# Patient Record
Sex: MALE | Race: WHITE | NOT HISPANIC OR LATINO
[De-identification: names, ages, dates, MRNs, and addresses within clinical notes are randomized per-mention and may not be internally consistent; named-entity substitution may affect disease eponyms.]

---

## 2017-01-31 ENCOUNTER — MEDICATION RENEWAL (OUTPATIENT)
Age: 62
End: 2017-01-31

## 2017-03-24 ENCOUNTER — MEDICATION RENEWAL (OUTPATIENT)
Age: 62
End: 2017-03-24

## 2017-04-25 ENCOUNTER — LABORATORY RESULT (OUTPATIENT)
Age: 62
End: 2017-04-25

## 2017-04-25 ENCOUNTER — APPOINTMENT (OUTPATIENT)
Dept: INTERNAL MEDICINE | Facility: CLINIC | Age: 62
End: 2017-04-25

## 2017-04-25 ENCOUNTER — NON-APPOINTMENT (OUTPATIENT)
Age: 62
End: 2017-04-25

## 2017-04-25 VITALS — HEIGHT: 68.5 IN | WEIGHT: 156 LBS | BODY MASS INDEX: 23.37 KG/M2

## 2017-04-25 VITALS — SYSTOLIC BLOOD PRESSURE: 120 MMHG | DIASTOLIC BLOOD PRESSURE: 70 MMHG

## 2017-04-25 LAB
BILIRUB UR QL STRIP: NORMAL
CLARITY UR: CLEAR
COLLECTION METHOD: NORMAL
GLUCOSE UR-MCNC: NORMAL
HCG UR QL: 0.2 EU/DL
HGB UR QL STRIP.AUTO: NORMAL
KETONES UR-MCNC: NORMAL
LEUKOCYTE ESTERASE UR QL STRIP: NORMAL
NITRITE UR QL STRIP: NORMAL
PH UR STRIP: 6
PROT UR STRIP-MCNC: NORMAL
SP GR UR STRIP: 1.01

## 2017-04-26 LAB
25(OH)D3 SERPL-MCNC: 25.4 NG/ML
ALBUMIN SERPL ELPH-MCNC: 4.5 G/DL
ALP BLD-CCNC: 52 U/L
ALT SERPL-CCNC: 23 U/L
ANION GAP SERPL CALC-SCNC: 17 MMOL/L
APPEARANCE: CLEAR
AST SERPL-CCNC: 26 U/L
BACTERIA: NEGATIVE
BASOPHILS # BLD AUTO: 0.01 K/UL
BASOPHILS NFR BLD AUTO: 0.1 %
BILIRUB SERPL-MCNC: 0.4 MG/DL
BILIRUBIN URINE: NEGATIVE
BLOOD URINE: ABNORMAL
BUN SERPL-MCNC: 14 MG/DL
CALCIUM SERPL-MCNC: 9.7 MG/DL
CHLORIDE SERPL-SCNC: 101 MMOL/L
CHOLEST SERPL-MCNC: 225 MG/DL
CHOLEST/HDLC SERPL: 2.6 RATIO
CO2 SERPL-SCNC: 23 MMOL/L
COLOR: YELLOW
CREAT SERPL-MCNC: 1.13 MG/DL
EOSINOPHIL # BLD AUTO: 0.16 K/UL
EOSINOPHIL NFR BLD AUTO: 2.3 %
FERRITIN SERPL-MCNC: 221.6 NG/ML
GLUCOSE QUALITATIVE U: NORMAL MG/DL
GLUCOSE SERPL-MCNC: 78 MG/DL
HBA1C MFR BLD HPLC: 5.8 %
HCT VFR BLD CALC: 43.8 %
HDLC SERPL-MCNC: 86 MG/DL
HGB BLD-MCNC: 14.4 G/DL
HYALINE CASTS: 0 /LPF
IMM GRANULOCYTES NFR BLD AUTO: 0.1 %
KETONES URINE: NEGATIVE
LDLC SERPL CALC-MCNC: 116 MG/DL
LEUKOCYTE ESTERASE URINE: NEGATIVE
LYMPHOCYTES # BLD AUTO: 1.98 K/UL
LYMPHOCYTES NFR BLD AUTO: 29 %
MAN DIFF?: NORMAL
MCHC RBC-ENTMCNC: 31 PG
MCHC RBC-ENTMCNC: 32.9 GM/DL
MCV RBC AUTO: 94.2 FL
MICROSCOPIC-UA: NORMAL
MONOCYTES # BLD AUTO: 0.4 K/UL
MONOCYTES NFR BLD AUTO: 5.9 %
NEUTROPHILS # BLD AUTO: 4.27 K/UL
NEUTROPHILS NFR BLD AUTO: 62.6 %
NITRITE URINE: NEGATIVE
PH URINE: 6.5
PLATELET # BLD AUTO: 281 K/UL
POTASSIUM SERPL-SCNC: 4.2 MMOL/L
PROT SERPL-MCNC: 7.6 G/DL
PROTEIN URINE: NEGATIVE MG/DL
PSA SERPL-MCNC: 2.18 NG/ML
RBC # BLD: 4.65 M/UL
RBC # FLD: 13.7 %
RED BLOOD CELLS URINE: 0 /HPF
SAVE SPECIMEN: NORMAL
SODIUM SERPL-SCNC: 141 MMOL/L
SPECIFIC GRAVITY URINE: 1.01
SQUAMOUS EPITHELIAL CELLS: 0 /HPF
T3RU NFR SERPL: 0.97 INDEX
T4 SERPL-MCNC: 4.6 UG/DL
TRIGL SERPL-MCNC: 114 MG/DL
TSH SERPL-ACNC: 2.16 UIU/ML
URATE SERPL-MCNC: 4 MG/DL
UROBILINOGEN URINE: NORMAL MG/DL
WBC # FLD AUTO: 6.83 K/UL
WHITE BLOOD CELLS URINE: 0 /HPF

## 2017-04-27 LAB — MERCURY BLD-MCNC: 1.7 UG/L

## 2017-05-01 ENCOUNTER — MEDICATION RENEWAL (OUTPATIENT)
Age: 62
End: 2017-05-01

## 2017-05-22 ENCOUNTER — APPOINTMENT (OUTPATIENT)
Dept: ORTHOPEDIC SURGERY | Facility: CLINIC | Age: 62
End: 2017-05-22

## 2017-06-13 ENCOUNTER — MEDICATION RENEWAL (OUTPATIENT)
Age: 62
End: 2017-06-13

## 2017-07-06 ENCOUNTER — APPOINTMENT (OUTPATIENT)
Dept: INTERNAL MEDICINE | Facility: CLINIC | Age: 62
End: 2017-07-06

## 2017-07-06 VITALS — HEIGHT: 68.5 IN | BODY MASS INDEX: 23.82 KG/M2 | WEIGHT: 159 LBS

## 2017-07-06 VITALS — SYSTOLIC BLOOD PRESSURE: 120 MMHG | DIASTOLIC BLOOD PRESSURE: 70 MMHG

## 2017-07-27 ENCOUNTER — MEDICATION RENEWAL (OUTPATIENT)
Age: 62
End: 2017-07-27

## 2017-10-10 ENCOUNTER — MEDICATION RENEWAL (OUTPATIENT)
Age: 62
End: 2017-10-10

## 2017-11-27 ENCOUNTER — MEDICATION RENEWAL (OUTPATIENT)
Age: 62
End: 2017-11-27

## 2018-02-06 ENCOUNTER — MEDICATION RENEWAL (OUTPATIENT)
Age: 63
End: 2018-02-06

## 2018-03-19 ENCOUNTER — MEDICATION RENEWAL (OUTPATIENT)
Age: 63
End: 2018-03-19

## 2018-04-25 ENCOUNTER — MEDICATION RENEWAL (OUTPATIENT)
Age: 63
End: 2018-04-25

## 2018-06-05 ENCOUNTER — MEDICATION RENEWAL (OUTPATIENT)
Age: 63
End: 2018-06-05

## 2018-07-16 ENCOUNTER — APPOINTMENT (OUTPATIENT)
Dept: INTERNAL MEDICINE | Facility: CLINIC | Age: 63
End: 2018-07-16
Payer: COMMERCIAL

## 2018-08-02 ENCOUNTER — LABORATORY RESULT (OUTPATIENT)
Age: 63
End: 2018-08-02

## 2018-08-02 ENCOUNTER — NON-APPOINTMENT (OUTPATIENT)
Age: 63
End: 2018-08-02

## 2018-08-02 ENCOUNTER — APPOINTMENT (OUTPATIENT)
Dept: INTERNAL MEDICINE | Facility: CLINIC | Age: 63
End: 2018-08-02
Payer: COMMERCIAL

## 2018-08-02 VITALS — SYSTOLIC BLOOD PRESSURE: 120 MMHG | DIASTOLIC BLOOD PRESSURE: 70 MMHG

## 2018-08-02 VITALS — WEIGHT: 157 LBS | BODY MASS INDEX: 23.52 KG/M2 | HEIGHT: 68.5 IN

## 2018-08-02 LAB
BILIRUB UR QL STRIP: NORMAL
CLARITY UR: CLEAR
COLLECTION METHOD: NORMAL
DATE COLLECTED: NORMAL
GLUCOSE UR-MCNC: NORMAL
HCG UR QL: 0.2 EU/DL
HEMOCCULT SP1 STL QL: NEGATIVE
HGB UR QL STRIP.AUTO: NORMAL
KETONES UR-MCNC: NORMAL
LEUKOCYTE ESTERASE UR QL STRIP: NORMAL
NITRITE UR QL STRIP: NORMAL
PH UR STRIP: 6.5
PROT UR STRIP-MCNC: NORMAL
QUALITY CONTROL: YES
SP GR UR STRIP: 1.02

## 2018-08-02 PROCEDURE — 36415 COLL VENOUS BLD VENIPUNCTURE: CPT

## 2018-08-02 PROCEDURE — 81003 URINALYSIS AUTO W/O SCOPE: CPT | Mod: QW

## 2018-08-02 PROCEDURE — 93000 ELECTROCARDIOGRAM COMPLETE: CPT

## 2018-08-02 PROCEDURE — 99396 PREV VISIT EST AGE 40-64: CPT | Mod: 25

## 2018-08-02 RX ORDER — DOXYCYCLINE HYCLATE 100 MG/1
100 CAPSULE ORAL
Qty: 14 | Refills: 1 | Status: DISCONTINUED | COMMUNITY
Start: 2017-07-06 | End: 2018-08-02

## 2018-08-02 RX ORDER — HYDROCORTISONE 25 MG/G
2.5 CREAM TOPICAL 3 TIMES DAILY
Qty: 1 | Refills: 2 | Status: DISCONTINUED | COMMUNITY
Start: 2017-07-06 | End: 2018-08-02

## 2018-08-02 RX ORDER — CLOTRIMAZOLE AND BETAMETHASONE DIPROPIONATE 10; .5 MG/G; MG/G
1-0.05 CREAM TOPICAL 3 TIMES DAILY
Qty: 1 | Refills: 2 | Status: DISCONTINUED | COMMUNITY
Start: 2017-04-25 | End: 2018-08-02

## 2018-08-02 RX ORDER — METHYLPREDNISOLONE 4 MG/1
4 TABLET ORAL
Qty: 1 | Refills: 1 | Status: DISCONTINUED | COMMUNITY
Start: 2017-07-06 | End: 2018-08-02

## 2018-08-02 NOTE — HEALTH RISK ASSESSMENT
[] : No [No falls in past year] : Patient reported no falls in the past year [0] : 2) Feeling down, depressed, or hopeless: Not at all (0) [QAK1Vfdcs] : 0 [Fully functional (bathing, dressing, toileting, transferring, walking, feeding)] : Fully functional (bathing, dressing, toileting, transferring, walking, feeding) [Fully functional (using the telephone, shopping, preparing meals, housekeeping, doing laundry, using] : Fully functional and needs no help or supervision to perform IADLs (using the telephone, shopping, preparing meals, housekeeping, doing laundry, using transportation, managing medications and managing finances)

## 2018-08-02 NOTE — PHYSICAL EXAM

## 2018-08-02 NOTE — ASSESSMENT
[FreeTextEntry1] : The patient's vital signs were stable. His physical examination is normal except for alopecia totalis and some moles. I recommended the patient see his neurologist. I also recommended that he proceed with a colonoscopy and also see a dermatologist

## 2018-08-02 NOTE — HISTORY OF PRESENT ILLNESS
[de-identified] : This is a 63-year-old gentleman with a history of alopecia totalis who is here today for his annual examination. 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 He has not followed up with his neurologist. In addition his last colonoscopy was 7 years ago

## 2018-08-03 LAB
25(OH)D3 SERPL-MCNC: 39.4 NG/ML
ALBUMIN SERPL ELPH-MCNC: 4.6 G/DL
ALP BLD-CCNC: 51 U/L
ALT SERPL-CCNC: 39 U/L
ANION GAP SERPL CALC-SCNC: 14 MMOL/L
APPEARANCE: CLEAR
AST SERPL-CCNC: 36 U/L
BACTERIA: NEGATIVE
BASOPHILS # BLD AUTO: 0.01 K/UL
BASOPHILS NFR BLD AUTO: 0.2 %
BILIRUB SERPL-MCNC: 0.5 MG/DL
BILIRUBIN URINE: NEGATIVE
BLOOD URINE: NEGATIVE
BUN SERPL-MCNC: 17 MG/DL
CALCIUM SERPL-MCNC: 9.4 MG/DL
CHLORIDE SERPL-SCNC: 101 MMOL/L
CHOLEST SERPL-MCNC: 214 MG/DL
CHOLEST/HDLC SERPL: 2.6 RATIO
CO2 SERPL-SCNC: 26 MMOL/L
COLOR: YELLOW
CREAT SERPL-MCNC: 1.09 MG/DL
EOSINOPHIL # BLD AUTO: 0.11 K/UL
EOSINOPHIL NFR BLD AUTO: 1.8 %
FERRITIN SERPL-MCNC: 215 NG/ML
GLUCOSE QUALITATIVE U: NEGATIVE MG/DL
GLUCOSE SERPL-MCNC: 82 MG/DL
HBA1C MFR BLD HPLC: 5.6 %
HCT VFR BLD CALC: 44.5 %
HDLC SERPL-MCNC: 82 MG/DL
HGB BLD-MCNC: 14.6 G/DL
HYALINE CASTS: 0 /LPF
IMM GRANULOCYTES NFR BLD AUTO: 0.2 %
KETONES URINE: ABNORMAL
LDLC SERPL CALC-MCNC: 112 MG/DL
LEUKOCYTE ESTERASE URINE: NEGATIVE
LYMPHOCYTES # BLD AUTO: 2.56 K/UL
LYMPHOCYTES NFR BLD AUTO: 42.3 %
MAN DIFF?: NORMAL
MCHC RBC-ENTMCNC: 31.6 PG
MCHC RBC-ENTMCNC: 32.8 GM/DL
MCV RBC AUTO: 96.3 FL
MICROSCOPIC-UA: NORMAL
MONOCYTES # BLD AUTO: 0.4 K/UL
MONOCYTES NFR BLD AUTO: 6.6 %
NEUTROPHILS # BLD AUTO: 2.96 K/UL
NEUTROPHILS NFR BLD AUTO: 48.9 %
NITRITE URINE: NEGATIVE
PH URINE: 6.5
PLATELET # BLD AUTO: 277 K/UL
POTASSIUM SERPL-SCNC: 4.5 MMOL/L
PROT SERPL-MCNC: 7.6 G/DL
PROTEIN URINE: NEGATIVE MG/DL
PSA SERPL-MCNC: 2.15 NG/ML
RBC # BLD: 4.62 M/UL
RBC # FLD: 13.8 %
RED BLOOD CELLS URINE: 2 /HPF
SAVE SPECIMEN: NORMAL
SODIUM SERPL-SCNC: 141 MMOL/L
SPECIFIC GRAVITY URINE: 1.02
SQUAMOUS EPITHELIAL CELLS: 0 /HPF
T3RU NFR SERPL: 1.03 INDEX
T4 SERPL-MCNC: 4.9 UG/DL
TRIGL SERPL-MCNC: 102 MG/DL
TSH SERPL-ACNC: 2.67 UIU/ML
URATE SERPL-MCNC: 4.1 MG/DL
UROBILINOGEN URINE: NEGATIVE MG/DL
VIT B12 SERPL-MCNC: 469 PG/ML
WBC # FLD AUTO: 6.05 K/UL
WHITE BLOOD CELLS URINE: 0 /HPF

## 2018-08-14 ENCOUNTER — MEDICATION RENEWAL (OUTPATIENT)
Age: 63
End: 2018-08-14

## 2018-09-25 ENCOUNTER — MEDICATION RENEWAL (OUTPATIENT)
Age: 63
End: 2018-09-25

## 2018-11-05 ENCOUNTER — MEDICATION RENEWAL (OUTPATIENT)
Age: 63
End: 2018-11-05

## 2018-12-10 ENCOUNTER — MEDICATION RENEWAL (OUTPATIENT)
Age: 63
End: 2018-12-10

## 2019-02-22 ENCOUNTER — MEDICATION RENEWAL (OUTPATIENT)
Age: 64
End: 2019-02-22

## 2019-04-04 ENCOUNTER — MEDICATION RENEWAL (OUTPATIENT)
Age: 64
End: 2019-04-04

## 2019-05-14 ENCOUNTER — MEDICATION RENEWAL (OUTPATIENT)
Age: 64
End: 2019-05-14

## 2019-06-05 ENCOUNTER — APPOINTMENT (OUTPATIENT)
Dept: MRI IMAGING | Facility: IMAGING CENTER | Age: 64
End: 2019-06-05
Payer: COMMERCIAL

## 2019-06-05 ENCOUNTER — OUTPATIENT (OUTPATIENT)
Dept: OUTPATIENT SERVICES | Facility: HOSPITAL | Age: 64
LOS: 1 days | End: 2019-06-05
Payer: COMMERCIAL

## 2019-06-05 DIAGNOSIS — R51 HEADACHE: ICD-10-CM

## 2019-06-05 PROCEDURE — 70551 MRI BRAIN STEM W/O DYE: CPT | Mod: 26

## 2019-06-05 PROCEDURE — 70551 MRI BRAIN STEM W/O DYE: CPT

## 2019-06-12 ENCOUNTER — MEDICATION RENEWAL (OUTPATIENT)
Age: 64
End: 2019-06-12

## 2019-06-13 ENCOUNTER — MEDICATION RENEWAL (OUTPATIENT)
Age: 64
End: 2019-06-13

## 2019-07-16 ENCOUNTER — MEDICATION RENEWAL (OUTPATIENT)
Age: 64
End: 2019-07-16

## 2019-08-18 ENCOUNTER — OUTPATIENT (OUTPATIENT)
Dept: OUTPATIENT SERVICES | Facility: HOSPITAL | Age: 64
LOS: 1 days | End: 2019-08-18
Payer: COMMERCIAL

## 2019-08-18 ENCOUNTER — APPOINTMENT (OUTPATIENT)
Dept: MRI IMAGING | Facility: IMAGING CENTER | Age: 64
End: 2019-08-18
Payer: COMMERCIAL

## 2019-08-18 DIAGNOSIS — Z00.8 ENCOUNTER FOR OTHER GENERAL EXAMINATION: ICD-10-CM

## 2019-08-18 PROCEDURE — 72141 MRI NECK SPINE W/O DYE: CPT

## 2019-08-18 PROCEDURE — 72141 MRI NECK SPINE W/O DYE: CPT | Mod: 26

## 2019-08-20 ENCOUNTER — MEDICATION RENEWAL (OUTPATIENT)
Age: 64
End: 2019-08-20

## 2019-09-24 ENCOUNTER — OUTPATIENT (OUTPATIENT)
Dept: OUTPATIENT SERVICES | Facility: HOSPITAL | Age: 64
LOS: 1 days | End: 2019-09-24
Payer: COMMERCIAL

## 2019-09-24 ENCOUNTER — APPOINTMENT (OUTPATIENT)
Dept: MRI IMAGING | Facility: IMAGING CENTER | Age: 64
End: 2019-09-24

## 2019-09-24 DIAGNOSIS — R51 HEADACHE: ICD-10-CM

## 2019-09-24 PROCEDURE — 70544 MR ANGIOGRAPHY HEAD W/O DYE: CPT

## 2019-09-24 PROCEDURE — 70544 MR ANGIOGRAPHY HEAD W/O DYE: CPT | Mod: 26

## 2019-09-25 ENCOUNTER — MEDICATION RENEWAL (OUTPATIENT)
Age: 64
End: 2019-09-25

## 2019-09-26 ENCOUNTER — MEDICATION RENEWAL (OUTPATIENT)
Age: 64
End: 2019-09-26

## 2019-10-28 ENCOUNTER — LABORATORY RESULT (OUTPATIENT)
Age: 64
End: 2019-10-28

## 2019-10-28 ENCOUNTER — MEDICATION RENEWAL (OUTPATIENT)
Age: 64
End: 2019-10-28

## 2019-10-28 ENCOUNTER — APPOINTMENT (OUTPATIENT)
Dept: INTERNAL MEDICINE | Facility: CLINIC | Age: 64
End: 2019-10-28
Payer: COMMERCIAL

## 2019-10-28 ENCOUNTER — NON-APPOINTMENT (OUTPATIENT)
Age: 64
End: 2019-10-28

## 2019-10-28 VITALS — HEIGHT: 68.5 IN | BODY MASS INDEX: 23.22 KG/M2 | WEIGHT: 155 LBS

## 2019-10-28 VITALS — SYSTOLIC BLOOD PRESSURE: 120 MMHG | DIASTOLIC BLOOD PRESSURE: 70 MMHG

## 2019-10-28 PROCEDURE — 93000 ELECTROCARDIOGRAM COMPLETE: CPT

## 2019-10-28 PROCEDURE — 90686 IIV4 VACC NO PRSV 0.5 ML IM: CPT

## 2019-10-28 PROCEDURE — 99396 PREV VISIT EST AGE 40-64: CPT | Mod: 25

## 2019-10-28 PROCEDURE — G0008: CPT

## 2019-10-28 PROCEDURE — 36415 COLL VENOUS BLD VENIPUNCTURE: CPT

## 2019-10-28 NOTE — HEALTH RISK ASSESSMENT
[] : No [No] : In the past 12 months have you used drugs other than those required for medical reasons? No [No falls in past year] : Patient reported no falls in the past year [0] : 2) Feeling down, depressed, or hopeless: Not at all (0) [Audit-CScore] : 0 [[id number]] : 0 [Change in mental status noted] : No change in mental status noted [Fully functional (bathing, dressing, toileting, transferring, walking, feeding)] : Fully functional (bathing, dressing, toileting, transferring, walking, feeding) [Fully functional (using the telephone, shopping, preparing meals, housekeeping, doing laundry, using] : Fully functional and needs no help or supervision to perform IADLs (using the telephone, shopping, preparing meals, housekeeping, doing laundry, using transportation, managing medications and managing finances)

## 2019-10-28 NOTE — ASSESSMENT
[FreeTextEntry1] : The patient's vital signs were stable. His complete physical examination was normal except for alopecia. His EKG did not reveal any acute changes. I advised the patient to proceed with colonoscopy, pneumonia vaccines, influenza vaccine

## 2019-10-29 LAB
25(OH)D3 SERPL-MCNC: 32.4 NG/ML
ALBUMIN SERPL ELPH-MCNC: 4.6 G/DL
ALP BLD-CCNC: 43 U/L
ALT SERPL-CCNC: 17 U/L
ANION GAP SERPL CALC-SCNC: 10 MMOL/L
AST SERPL-CCNC: 21 U/L
BASOPHILS # BLD AUTO: 0.02 K/UL
BASOPHILS NFR BLD AUTO: 0.4 %
BILIRUB SERPL-MCNC: 0.5 MG/DL
BUN SERPL-MCNC: 16 MG/DL
CALCIUM SERPL-MCNC: 9.5 MG/DL
CHLORIDE SERPL-SCNC: 103 MMOL/L
CHOLEST SERPL-MCNC: 231 MG/DL
CHOLEST/HDLC SERPL: 2.6 RATIO
CO2 SERPL-SCNC: 27 MMOL/L
CREAT SERPL-MCNC: 1.06 MG/DL
EOSINOPHIL # BLD AUTO: 0.14 K/UL
EOSINOPHIL NFR BLD AUTO: 2.5 %
ESTIMATED AVERAGE GLUCOSE: 105 MG/DL
FERRITIN SERPL-MCNC: 181 NG/ML
GLUCOSE SERPL-MCNC: 83 MG/DL
HBA1C MFR BLD HPLC: 5.3 %
HCT VFR BLD CALC: 44.2 %
HDLC SERPL-MCNC: 88 MG/DL
HGB BLD-MCNC: 14.2 G/DL
IMM GRANULOCYTES NFR BLD AUTO: 0.2 %
LDLC SERPL CALC-MCNC: 120 MG/DL
LYMPHOCYTES # BLD AUTO: 2.15 K/UL
LYMPHOCYTES NFR BLD AUTO: 38.5 %
MAN DIFF?: NORMAL
MCHC RBC-ENTMCNC: 31.1 PG
MCHC RBC-ENTMCNC: 32.1 GM/DL
MCV RBC AUTO: 96.7 FL
MONOCYTES # BLD AUTO: 0.37 K/UL
MONOCYTES NFR BLD AUTO: 6.6 %
NEUTROPHILS # BLD AUTO: 2.9 K/UL
NEUTROPHILS NFR BLD AUTO: 51.8 %
PLATELET # BLD AUTO: 277 K/UL
POTASSIUM SERPL-SCNC: 4.5 MMOL/L
PROT SERPL-MCNC: 7.1 G/DL
PSA SERPL-MCNC: 2.5 NG/ML
RBC # BLD: 4.57 M/UL
RBC # FLD: 13.4 %
SAVE SPECIMEN: NORMAL
SODIUM SERPL-SCNC: 140 MMOL/L
T3RU NFR SERPL: 1 TBI
T4 SERPL-MCNC: 3.8 UG/DL
TRIGL SERPL-MCNC: 115 MG/DL
TSH SERPL-ACNC: 2.19 UIU/ML
URATE SERPL-MCNC: 4.3 MG/DL
VIT B12 SERPL-MCNC: 419 PG/ML
WBC # FLD AUTO: 5.59 K/UL

## 2019-11-27 ENCOUNTER — OTHER (OUTPATIENT)
Age: 64
End: 2019-11-27

## 2019-12-05 ENCOUNTER — MEDICATION RENEWAL (OUTPATIENT)
Age: 64
End: 2019-12-05

## 2020-06-13 ENCOUNTER — TRANSCRIPTION ENCOUNTER (OUTPATIENT)
Age: 65
End: 2020-06-13

## 2020-11-03 ENCOUNTER — LABORATORY RESULT (OUTPATIENT)
Age: 65
End: 2020-11-03

## 2020-11-03 ENCOUNTER — NON-APPOINTMENT (OUTPATIENT)
Age: 65
End: 2020-11-03

## 2020-11-03 ENCOUNTER — APPOINTMENT (OUTPATIENT)
Dept: INTERNAL MEDICINE | Facility: CLINIC | Age: 65
End: 2020-11-03
Payer: COMMERCIAL

## 2020-11-03 VITALS — DIASTOLIC BLOOD PRESSURE: 70 MMHG | SYSTOLIC BLOOD PRESSURE: 120 MMHG

## 2020-11-03 VITALS — HEIGHT: 68.5 IN | BODY MASS INDEX: 22.47 KG/M2 | TEMPERATURE: 97.2 F | WEIGHT: 150 LBS

## 2020-11-03 DIAGNOSIS — L85.3 XEROSIS CUTIS: ICD-10-CM

## 2020-11-03 DIAGNOSIS — Z23 ENCOUNTER FOR IMMUNIZATION: ICD-10-CM

## 2020-11-03 DIAGNOSIS — Z87.09 PERSONAL HISTORY OF OTHER DISEASES OF THE RESPIRATORY SYSTEM: ICD-10-CM

## 2020-11-03 PROCEDURE — 90662 IIV NO PRSV INCREASED AG IM: CPT

## 2020-11-03 PROCEDURE — 36415 COLL VENOUS BLD VENIPUNCTURE: CPT

## 2020-11-03 PROCEDURE — 99397 PER PM REEVAL EST PAT 65+ YR: CPT | Mod: 25

## 2020-11-03 PROCEDURE — 93000 ELECTROCARDIOGRAM COMPLETE: CPT

## 2020-11-03 PROCEDURE — 99072 ADDL SUPL MATRL&STAF TM PHE: CPT

## 2020-11-03 PROCEDURE — G0008: CPT

## 2020-11-03 NOTE — ASSESSMENT
[FreeTextEntry1] : The patient's blood pressure was 130/80. His physical examination was significant for alopecia. The patient was advised that he is due for his colonoscopy his influenza vaccine and his pneumococcal vaccine.

## 2020-11-03 NOTE — PHYSICAL EXAM
[Normal Sphincter Tone] : normal sphincter tone [No Mass] : no mass [Stool Occult Blood] : stool negative for occult blood [Urethral Meatus] : meatus normal [Urinary Bladder Findings] : the bladder was normal on palpation [Scrotum] : the scrotum was normal [Testes Mass (___cm)] : there were no testicular masses [No Prostate Nodules] : no prostate nodules [Normal] : affect was normal and insight and judgment were intact [de-identified] : alopecia to

## 2020-11-03 NOTE — HEALTH RISK ASSESSMENT
[] : No [No] : No [No falls in past year] : Patient reported no falls in the past year [0] : 2) Feeling down, depressed, or hopeless: Not at all (0) [[id number]] : 0 [Fully functional (bathing, dressing, toileting, transferring, walking, feeding)] : Fully functional (bathing, dressing, toileting, transferring, walking, feeding) [Fully functional (using the telephone, shopping, preparing meals, housekeeping, doing laundry, using] : Fully functional and needs no help or supervision to perform IADLs (using the telephone, shopping, preparing meals, housekeeping, doing laundry, using transportation, managing medications and managing finances) [Reviewed no changes] : Reviewed no changes [I will adhere to the patient's wishes as expressed in the advance directive except as noted below.] : I will adhere to the patient's wishes as expressed in the advance directive except as noted below [AdvancecareDate] : 11/3/2020

## 2020-11-04 LAB
25(OH)D3 SERPL-MCNC: 42.3 NG/ML
ALBUMIN SERPL ELPH-MCNC: 4.8 G/DL
ALP BLD-CCNC: 50 U/L
ALT SERPL-CCNC: 23 U/L
ANION GAP SERPL CALC-SCNC: 12 MMOL/L
APPEARANCE: CLEAR
AST SERPL-CCNC: 21 U/L
BACTERIA: NEGATIVE
BASOPHILS # BLD AUTO: 0.02 K/UL
BASOPHILS NFR BLD AUTO: 0.3 %
BILIRUB SERPL-MCNC: 0.8 MG/DL
BILIRUBIN URINE: NEGATIVE
BLOOD URINE: NEGATIVE
BUN SERPL-MCNC: 16 MG/DL
CALCIUM SERPL-MCNC: 9.6 MG/DL
CHLORIDE SERPL-SCNC: 103 MMOL/L
CHOLEST SERPL-MCNC: 239 MG/DL
CO2 SERPL-SCNC: 27 MMOL/L
COLOR: NORMAL
CREAT SERPL-MCNC: 1.08 MG/DL
EOSINOPHIL # BLD AUTO: 0.09 K/UL
EOSINOPHIL NFR BLD AUTO: 1.3 %
ESTIMATED AVERAGE GLUCOSE: 108 MG/DL
FERRITIN SERPL-MCNC: 177 NG/ML
FOLATE SERPL-MCNC: 10.9 NG/ML
GLUCOSE QUALITATIVE U: NEGATIVE
GLUCOSE SERPL-MCNC: 104 MG/DL
HBA1C MFR BLD HPLC: 5.4 %
HCT VFR BLD CALC: 46.1 %
HDLC SERPL-MCNC: 86 MG/DL
HGB BLD-MCNC: 15 G/DL
HYALINE CASTS: 0 /LPF
IMM GRANULOCYTES NFR BLD AUTO: 0.1 %
KETONES URINE: NEGATIVE
LDLC SERPL CALC-MCNC: 138 MG/DL
LEUKOCYTE ESTERASE URINE: NEGATIVE
LYMPHOCYTES # BLD AUTO: 1.98 K/UL
LYMPHOCYTES NFR BLD AUTO: 29.1 %
MAN DIFF?: NORMAL
MCHC RBC-ENTMCNC: 31.1 PG
MCHC RBC-ENTMCNC: 32.5 GM/DL
MCV RBC AUTO: 95.6 FL
MICROSCOPIC-UA: NORMAL
MONOCYTES # BLD AUTO: 0.32 K/UL
MONOCYTES NFR BLD AUTO: 4.7 %
NEUTROPHILS # BLD AUTO: 4.38 K/UL
NEUTROPHILS NFR BLD AUTO: 64.5 %
NITRITE URINE: NEGATIVE
NONHDLC SERPL-MCNC: 153 MG/DL
PH URINE: 6.5
PLATELET # BLD AUTO: 290 K/UL
POTASSIUM SERPL-SCNC: 4.4 MMOL/L
PROT SERPL-MCNC: 7.1 G/DL
PROTEIN URINE: NEGATIVE
PSA SERPL-MCNC: 2.53 NG/ML
RBC # BLD: 4.82 M/UL
RBC # FLD: 13 %
RED BLOOD CELLS URINE: 1 /HPF
SAVE SPECIMEN: NORMAL
SODIUM SERPL-SCNC: 142 MMOL/L
SPECIFIC GRAVITY URINE: 1.02
SQUAMOUS EPITHELIAL CELLS: 0 /HPF
T3RU NFR SERPL: 1 TBI
T4 SERPL-MCNC: 4.4 UG/DL
TRIGL SERPL-MCNC: 75 MG/DL
TSH SERPL-ACNC: 1.95 UIU/ML
URATE SERPL-MCNC: 3.5 MG/DL
UROBILINOGEN URINE: NORMAL
VIT B12 SERPL-MCNC: 454 PG/ML
WBC # FLD AUTO: 6.8 K/UL
WHITE BLOOD CELLS URINE: 0 /HPF

## 2021-11-11 ENCOUNTER — LABORATORY RESULT (OUTPATIENT)
Age: 66
End: 2021-11-11

## 2021-11-11 ENCOUNTER — APPOINTMENT (OUTPATIENT)
Dept: INTERNAL MEDICINE | Facility: CLINIC | Age: 66
End: 2021-11-11
Payer: COMMERCIAL

## 2021-11-11 ENCOUNTER — NON-APPOINTMENT (OUTPATIENT)
Age: 66
End: 2021-11-11

## 2021-11-11 VITALS — WEIGHT: 146.38 LBS | BODY MASS INDEX: 21.68 KG/M2 | HEIGHT: 69 IN

## 2021-11-11 VITALS — SYSTOLIC BLOOD PRESSURE: 120 MMHG | DIASTOLIC BLOOD PRESSURE: 70 MMHG

## 2021-11-11 PROCEDURE — 93000 ELECTROCARDIOGRAM COMPLETE: CPT

## 2021-11-11 PROCEDURE — 99397 PER PM REEVAL EST PAT 65+ YR: CPT | Mod: 25

## 2021-11-11 PROCEDURE — 36415 COLL VENOUS BLD VENIPUNCTURE: CPT

## 2021-11-11 PROCEDURE — G0008: CPT

## 2021-11-11 PROCEDURE — 90662 IIV NO PRSV INCREASED AG IM: CPT

## 2021-11-11 NOTE — HISTORY OF PRESENT ILLNESS
[de-identified] : This is a 66 -year-old gentleman with a history of alopecia, anxiety, migraine headaches who is here today for his annual wall examination.

## 2021-11-11 NOTE — HEALTH RISK ASSESSMENT
[No] : No [No falls in past year] : Patient reported no falls in the past year [0] : 2) Feeling down, depressed, or hopeless: Not at all (0) [Fully functional (bathing, dressing, toileting, transferring, walking, feeding)] : Fully functional (bathing, dressing, toileting, transferring, walking, feeding) [Fully functional (using the telephone, shopping, preparing meals, housekeeping, doing laundry, using] : Fully functional and needs no help or supervision to perform IADLs (using the telephone, shopping, preparing meals, housekeeping, doing laundry, using transportation, managing medications and managing finances) [] : No [[id number]] : 0

## 2021-11-11 NOTE — PHYSICAL EXAM
[Normal Sphincter Tone] : normal sphincter tone [No Mass] : no mass [Urethral Meatus] : meatus normal [Urinary Bladder Findings] : the bladder was normal on palpation [Scrotum] : the scrotum was normal [Testes Mass (___cm)] : there were no testicular masses [No Prostate Nodules] : no prostate nodules [Normal] : affect was normal and insight and judgment were intact [Stool Occult Blood] : stool negative for occult blood [de-identified] : alopecia to

## 2021-11-14 LAB
25(OH)D3 SERPL-MCNC: 34.7 NG/ML
ALBUMIN SERPL ELPH-MCNC: 4.3 G/DL
ALP BLD-CCNC: 45 U/L
ALT SERPL-CCNC: 22 U/L
ANION GAP SERPL CALC-SCNC: 11 MMOL/L
APPEARANCE: CLEAR
AST SERPL-CCNC: 20 U/L
BACTERIA: NEGATIVE
BASOPHILS # BLD AUTO: 0.04 K/UL
BASOPHILS NFR BLD AUTO: 0.6 %
BILIRUB SERPL-MCNC: 0.5 MG/DL
BILIRUBIN URINE: NEGATIVE
BLOOD URINE: ABNORMAL
BUN SERPL-MCNC: 15 MG/DL
CALCIUM SERPL-MCNC: 9.1 MG/DL
CHLORIDE SERPL-SCNC: 105 MMOL/L
CHOLEST SERPL-MCNC: 225 MG/DL
CO2 SERPL-SCNC: 25 MMOL/L
COLOR: YELLOW
CREAT SERPL-MCNC: 1.08 MG/DL
EOSINOPHIL # BLD AUTO: 0.19 K/UL
EOSINOPHIL NFR BLD AUTO: 3.1 %
ESTIMATED AVERAGE GLUCOSE: 111 MG/DL
FOLATE SERPL-MCNC: 12 NG/ML
GLUCOSE QUALITATIVE U: ABNORMAL
GLUCOSE SERPL-MCNC: 85 MG/DL
HBA1C MFR BLD HPLC: 5.5 %
HCT VFR BLD CALC: 45.2 %
HDLC SERPL-MCNC: 92 MG/DL
HGB BLD-MCNC: 14.8 G/DL
HYALINE CASTS: 2 /LPF
IMM GRANULOCYTES NFR BLD AUTO: 0.2 %
KETONES URINE: NORMAL
LDLC SERPL CALC-MCNC: 103 MG/DL
LEUKOCYTE ESTERASE URINE: NEGATIVE
LYMPHOCYTES # BLD AUTO: 1.76 K/UL
LYMPHOCYTES NFR BLD AUTO: 28.4 %
MAN DIFF?: NORMAL
MCHC RBC-ENTMCNC: 32 PG
MCHC RBC-ENTMCNC: 32.7 GM/DL
MCV RBC AUTO: 97.8 FL
MICROSCOPIC-UA: NORMAL
MONOCYTES # BLD AUTO: 0.39 K/UL
MONOCYTES NFR BLD AUTO: 6.3 %
NEUTROPHILS # BLD AUTO: 3.81 K/UL
NEUTROPHILS NFR BLD AUTO: 61.4 %
NITRITE URINE: NEGATIVE
NONHDLC SERPL-MCNC: 133 MG/DL
PH URINE: 5.5
PLATELET # BLD AUTO: 261 K/UL
POTASSIUM SERPL-SCNC: 4.5 MMOL/L
PROT SERPL-MCNC: 6.8 G/DL
PROTEIN URINE: NORMAL
PSA SERPL-MCNC: 2.77 NG/ML
RBC # BLD: 4.62 M/UL
RBC # FLD: 13.2 %
RED BLOOD CELLS URINE: 6 /HPF
SODIUM SERPL-SCNC: 141 MMOL/L
SPECIFIC GRAVITY URINE: 1.03
SQUAMOUS EPITHELIAL CELLS: 3 /HPF
T3RU NFR SERPL: 1.1 TBI
T4 SERPL-MCNC: 4.2 UG/DL
TRIGL SERPL-MCNC: 150 MG/DL
TSH SERPL-ACNC: 1.36 UIU/ML
URATE SERPL-MCNC: 4.1 MG/DL
UROBILINOGEN URINE: NORMAL
VIT B12 SERPL-MCNC: 438 PG/ML
WBC # FLD AUTO: 6.2 K/UL
WHITE BLOOD CELLS URINE: 8 /HPF

## 2022-06-03 ENCOUNTER — LABORATORY RESULT (OUTPATIENT)
Age: 67
End: 2022-06-03

## 2022-06-03 ENCOUNTER — APPOINTMENT (OUTPATIENT)
Dept: INTERNAL MEDICINE | Facility: CLINIC | Age: 67
End: 2022-06-03
Payer: COMMERCIAL

## 2022-06-03 VITALS — WEIGHT: 149.8 LBS | HEIGHT: 69 IN | BODY MASS INDEX: 22.19 KG/M2

## 2022-06-03 VITALS — DIASTOLIC BLOOD PRESSURE: 70 MMHG | SYSTOLIC BLOOD PRESSURE: 120 MMHG

## 2022-06-03 DIAGNOSIS — L65.9 NONSCARRING HAIR LOSS, UNSPECIFIED: ICD-10-CM

## 2022-06-03 LAB
BILIRUB UR QL STRIP: NEGATIVE
CLARITY UR: CLEAR
COLLECTION METHOD: NORMAL
GLUCOSE UR-MCNC: NEGATIVE
HCG UR QL: 0.2 EU/DL
HGB UR QL STRIP.AUTO: NORMAL
KETONES UR-MCNC: NEGATIVE
LEUKOCYTE ESTERASE UR QL STRIP: NEGATIVE
NITRITE UR QL STRIP: NEGATIVE
PH UR STRIP: 6.5
PROT UR STRIP-MCNC: NORMAL
SP GR UR STRIP: >=1.03

## 2022-06-03 PROCEDURE — 81003 URINALYSIS AUTO W/O SCOPE: CPT | Mod: QW

## 2022-06-03 PROCEDURE — 36415 COLL VENOUS BLD VENIPUNCTURE: CPT

## 2022-06-03 PROCEDURE — 99214 OFFICE O/P EST MOD 30 MIN: CPT | Mod: 25

## 2022-06-03 NOTE — PHYSICAL EXAM
[Normal] : no carotid or abdominal bruits heard, no varicosities, pedal pulses are present, no peripheral edema, no extremity clubbing or cyanosis and no palpable aorta [Normal Sphincter Tone] : normal sphincter tone [No Mass] : no mass [Testes Tenderness] : no tenderness of the testes [Testes Mass (___cm)] : there were no testicular masses [Prostate Enlargement] : the prostate was not enlarged [Prostate Tenderness] : the prostate was not tender [No Prostate Nodules] : no prostate nodules

## 2022-06-03 NOTE — HISTORY OF PRESENT ILLNESS
[de-identified] : This is a 66-year-old gentleman with a history of anxiety attacks and alopecia who today noticed some blood in the urine associated with some suprapubic discomfort.  Also he noticed an undue amount of urinary frequency.  The patient is complaining of significant suprapubic pain and occasional flank pain

## 2022-06-03 NOTE — ASSESSMENT
[FreeTextEntry1] : This is a 66-year-old gentleman complaining of an episode of urinary frequency and noticing a small amount of blood in urine this afternoon.  He also noticed some vague suprapubic discomfort and flank pain or abdominal His blood pressure was normal and his physical examination was negative.  Of note is that his urinalysis reveals a large amount of blood in the urine without any evidence of pyuria or bacteriuria.  His urine was sent for culture and in addition his sugar was 120.  I referred the patient for stat contrast CT of the abdomen and pelvis to rule out a kidney stone, renal carcinoma, possible bladder carcinoma.  In addition I referred him to urology for further evaluation

## 2022-06-04 LAB
25(OH)D3 SERPL-MCNC: 44.1 NG/ML
ALBUMIN SERPL ELPH-MCNC: 4.4 G/DL
ALP BLD-CCNC: 41 U/L
ALT SERPL-CCNC: 22 U/L
ANION GAP SERPL CALC-SCNC: 10 MMOL/L
AST SERPL-CCNC: 23 U/L
BASOPHILS # BLD AUTO: 0.04 K/UL
BASOPHILS NFR BLD AUTO: 0.4 %
BILIRUB SERPL-MCNC: 0.5 MG/DL
BUN SERPL-MCNC: 18 MG/DL
CALCIUM SERPL-MCNC: 9.5 MG/DL
CHLORIDE SERPL-SCNC: 106 MMOL/L
CHOLEST SERPL-MCNC: 216 MG/DL
CO2 SERPL-SCNC: 25 MMOL/L
CREAT SERPL-MCNC: 1.05 MG/DL
EGFR: 78 ML/MIN/1.73M2
EOSINOPHIL # BLD AUTO: 0.24 K/UL
EOSINOPHIL NFR BLD AUTO: 2.6 %
ESTIMATED AVERAGE GLUCOSE: 108 MG/DL
FERRITIN SERPL-MCNC: 132 NG/ML
FOLATE SERPL-MCNC: 13.4 NG/ML
GLUCOSE SERPL-MCNC: 132 MG/DL
HBA1C MFR BLD HPLC: 5.4 %
HCT VFR BLD CALC: 43.5 %
HDLC SERPL-MCNC: 98 MG/DL
HGB BLD-MCNC: 14.4 G/DL
IMM GRANULOCYTES NFR BLD AUTO: 0.2 %
LDLC SERPL CALC-MCNC: 101 MG/DL
LYMPHOCYTES # BLD AUTO: 1.8 K/UL
LYMPHOCYTES NFR BLD AUTO: 19.7 %
MAN DIFF?: NORMAL
MCHC RBC-ENTMCNC: 32.1 PG
MCHC RBC-ENTMCNC: 33.1 GM/DL
MCV RBC AUTO: 97.1 FL
MONOCYTES # BLD AUTO: 0.47 K/UL
MONOCYTES NFR BLD AUTO: 5.1 %
NEUTROPHILS # BLD AUTO: 6.59 K/UL
NEUTROPHILS NFR BLD AUTO: 72 %
NONHDLC SERPL-MCNC: 117 MG/DL
PLATELET # BLD AUTO: 271 K/UL
POTASSIUM SERPL-SCNC: 4.2 MMOL/L
PROT SERPL-MCNC: 6.6 G/DL
PSA SERPL-MCNC: 3.35 NG/ML
RBC # BLD: 4.48 M/UL
RBC # FLD: 13.5 %
SODIUM SERPL-SCNC: 141 MMOL/L
T3RU NFR SERPL: 1 TBI
T4 SERPL-MCNC: 3.9 UG/DL
TRIGL SERPL-MCNC: 80 MG/DL
TSH SERPL-ACNC: 2.09 UIU/ML
URATE SERPL-MCNC: 3.7 MG/DL
VIT B12 SERPL-MCNC: 363 PG/ML
WBC # FLD AUTO: 9.16 K/UL

## 2022-06-05 LAB — BACTERIA UR CULT: NORMAL

## 2022-06-06 ENCOUNTER — RESULT REVIEW (OUTPATIENT)
Age: 67
End: 2022-06-06

## 2022-06-06 ENCOUNTER — APPOINTMENT (OUTPATIENT)
Dept: UROLOGY | Facility: CLINIC | Age: 67
End: 2022-06-06
Payer: COMMERCIAL

## 2022-06-06 DIAGNOSIS — H53.132 SUDDEN VISUAL LOSS, LEFT EYE: ICD-10-CM

## 2022-06-06 PROCEDURE — 99205 OFFICE O/P NEW HI 60 MIN: CPT

## 2022-06-06 NOTE — HISTORY OF PRESENT ILLNESS
[FreeTextEntry1] : 06/06/2022:  is a 67 y/o M that presents today for evaluation of terminal gross hematuria. He reports that on 6/3 and 6/4 he experienced some degree of uncontrolled urination. At conclusion he had some minor pain in the penis and noticed some blood in his urine. Denies any flank or lower back pain. Did not see any stones come out. Has never had anything like this before. Is now fully recovered without intervention and feels well. Prior to his episode he was away on a business trip, drove 4 hours home, had a strong urge to urinate and was surprised at how uncontrolled it was. Often takes long drives so the drive was nothing new to him. Pt has seen  for almost 30 years, advised the patient to see me since there was blood in his urine. Prior to this episode of uncontrolled urination, he reports nocturia x1-2. Denies urinary urgency, pushing, straining, burning. The pt takes baby aspirin around 4-5x a week. Had a TIA a few years ago, couldn't see out of left eye for 2 minutes. Hx of panic attack due to work stress prior to TIA. Has not consulted with his neurologist if he should be taking baby aspirin every day or 4-5x. Patient has now cut back on his workload. Pt is not allergic to contrast. Recently had a colonoscopy. Pt also reports that he had about 2-3 episodes over a 9 month period dating back to late last summer of lower groin pain. These episodes lasted 1-2 hours and resolved on its own, appears to be gastrointestinal.  Pt exercises often, does 35 sit ups every morning.

## 2022-06-06 NOTE — ASSESSMENT
[FreeTextEntry1] : 06/06/2022:  is a 67 y/o M that presents today for evaluation of terminal gross hematuria. He reports that on 6/3 and 6/4 he experienced some degree of uncontrolled urination. At conclusion he had some minor pain in the penis and noticed some blood in his urine. Denies any flank or lower back pain. Did not see any stones come out. Has never had anything like this before. Is now fully recovered without intervention and feels well. Prior to his episode he was away on a business trip, drove 4 hours home, had a strong urge to urinate and was surprised at how uncontrolled it was. Often takes long drives so the drive was nothing new to him. Pt has seen  for almost 30 years, advised the patient to see me since there was blood in his urine. Prior to this episode of uncontrolled urination, he reports nocturia x1-2. Denies urinary urgency, pushing, straining, burning. The pt takes baby aspirin around 4-5x a week. Had a TIA a few years ago, couldn't see out of left eye for 2 minutes. Hx of panic attack due to work stress prior to TIA. Has not consulted with his neurologist if he should be taking baby aspirin every day or 4-5x. Patient has now cut back on his workload. Pt is not allergic to contrast. Recently had a colonoscopy. Pt also reports that he had about 2-3 episodes over a 9 month period dating back to late last summer of lower groin pain. These episodes lasted 1-2 hours and resolved on its own, appears to be gastrointestinal. Pt exercises often, does 35 sit ups every morning. \par \par The patient produced a urine sample which will be sent for urinalysis, urine cytology, and urine culture. \par \par Reviewed and discussed laboratory work ordered by  on 6/3/2022. At that time PSA was 3.35. Creatinine was 1.05. \par \par My recommendation was that the patient have a cystoscopy. The pt was fearful of the procedure and therefore I am prescribing him Valium 10 mg to be taken prior. Given the patient's hx of panic attack, I advised him to try taking one tablet when he has nothing to do to to see if it will be sufficient enough to relax him for the procedure. I advised the patient to eat on the day of the procedure. If he has the procedure with Valium, he is aware he needs someone to drive him since Valium lasts 6 hours. I informed the patient we will insert lidocaine for local anesthesia. I discussed the risk of infection, but informed the patient that we will provide an antibiotic after the procedure and at bedtime. I discussed the risk of urinary ry retention and gross hematuria.\par \par I am sending the patient for a CT abdomen and pelvis w/wo IV contrast. \par \par I advised the patient to see his neurologist to ask whether or not he should be taking baby aspirin daily. \par \par Patient will RTO next week for cystoscopy and to review the results of his CT.\par \par Preparation, in person office visit, and coordination of care: 60 minutes.

## 2022-06-06 NOTE — ADDENDUM
[FreeTextEntry1] : This note was authored by Teena Ralph working as a scribe for Dr.Gary Parra. I, Dr. Jesus Parra have reviewed the content of this note and confirm it is true and accurate. I personally performed the history and physical examination and made all the decisions 06/06/2022.

## 2022-06-06 NOTE — LETTER HEADER
[FreeTextEntry3] : Dr. Jesus Parra \par [address] [facility], [facility] [address] \par [phone]\par

## 2022-06-06 NOTE — LETTER BODY
[Dear  ___] : Dear  [unfilled], [Consult Letter:] : I had the pleasure of evaluating your patient, [unfilled]. [Please see my note below.] : Please see my note below. [Consult Closing:] : Thank you very much for allowing me to participate in the care of this patient.  If you have any questions, please do not hesitate to contact me. [Sincerely,] : Sincerely, [FreeTextEntry2] : Dr. Sang Panda \par [address], [place] [address]\par [phone]\par   [FreeTextEntry1] : 06/06/2022:  is a 65 y/o M that presents today for evaluation of terminal gross hematuria. He reports that on 6/3 and 6/4 he experienced some degree of uncontrolled urination. At conclusion he had some minor pain in the penis and noticed some blood in his urine. Denies any flank or lower back pain. Did not see any stones come out. Has never had anything like this before. Is now fully recovered without intervention and feels well. Prior to his episode he was away on a business trip, drove 4 hours home, had a strong urge to urinate and was surprised at how uncontrolled it was. Often takes long drives so the drive was nothing new to him. Pt has seen  for almost 30 years, advised the patient to see me since there was blood in his urine. Prior to this episode of uncontrolled urination, he reports nocturia x1-2. Denies urinary urgency, pushing, straining, burning. The pt takes baby aspirin around 4-5x a week. Had a TIA a few years ago, couldn't see out of left eye for 2 minutes. Hx of panic attack due to work stress prior to TIA. Has not consulted with his neurologist if he should be taking baby aspirin every day or 4-5x. Patient has now cut back on his workload. Pt is not allergic to contrast. Recently had a colonoscopy. Pt also reports that he had about 2-3 episodes over a 9 month period dating back to late last summer of lower groin pain. These episodes lasted 1-2 hours and resolved on its own, appears to be gastrointestinal. Pt exercises often, does 35 sit ups every morning. \par \par The patient produced a urine sample which will be sent for urinalysis, urine cytology, and urine culture. \par \par Reviewed and discussed laboratory work ordered by  on 6/3/2022. At that time PSA was 3.35. Creatinine was 1.05. \par \par My recommendation was that the patient have a cystoscopy. The pt was fearful of the procedure and therefore I am prescribing him Valium 10 mg to be taken prior. Given the patient's hx of panic attack, I advised him to try taking one tablet when he has nothing to do to to see if it will be sufficient enough to relax him for the procedure. I advised the patient to eat on the day of the procedure. If he has the procedure with Valium, he is aware he needs someone to drive him since Valium lasts 6 hours. I informed the patient we will insert lidocaine for local anesthesia. I discussed the risk of infection, but informed the patient that we will provide an antibiotic after the procedure and at bedtime. \par \par I am sending the patient for a CT abdomen and pelvis w/wo IV contrast. \par \par I advised the patient to see his neurologist to ask whether or not he should be taking baby aspirin daily. \par \par Patient will RTO next week for cystoscopy and to review the results of his CT. [FreeTextEntry3] : Jesus Parra MD, PhD

## 2022-06-06 NOTE — REVIEW OF SYSTEMS
[Abdominal Pain] : abdominal pain [Negative] : Heme/Lymph [Blood in urine that you can see] : blood visible in urine [Feeling Poorly] : not feeling poorly [Feeling Tired] : not feeling tired

## 2022-06-07 LAB
APPEARANCE: CLEAR
BACTERIA: NEGATIVE
BILIRUBIN URINE: NEGATIVE
BLOOD URINE: ABNORMAL
COLOR: NORMAL
GLUCOSE QUALITATIVE U: NEGATIVE
HYALINE CASTS: 0 /LPF
KETONES URINE: NORMAL
LEUKOCYTE ESTERASE URINE: NEGATIVE
MICROSCOPIC-UA: NORMAL
NITRITE URINE: NEGATIVE
PH URINE: 5.5
PROTEIN URINE: NORMAL
RED BLOOD CELLS URINE: 6 /HPF
SPECIFIC GRAVITY URINE: 1.02
SQUAMOUS EPITHELIAL CELLS: 0 /HPF
URINE CYTOLOGY: NORMAL
UROBILINOGEN URINE: NORMAL
WHITE BLOOD CELLS URINE: 1 /HPF

## 2022-06-08 ENCOUNTER — NON-APPOINTMENT (OUTPATIENT)
Age: 67
End: 2022-06-08

## 2022-06-08 LAB — BACTERIA UR CULT: NORMAL

## 2022-06-09 ENCOUNTER — APPOINTMENT (OUTPATIENT)
Dept: UROLOGY | Facility: CLINIC | Age: 67
End: 2022-06-09
Payer: COMMERCIAL

## 2022-06-09 ENCOUNTER — NON-APPOINTMENT (OUTPATIENT)
Age: 67
End: 2022-06-09

## 2022-06-09 DIAGNOSIS — R10.9 UNSPECIFIED ABDOMINAL PAIN: ICD-10-CM

## 2022-06-09 PROCEDURE — 99443: CPT

## 2022-06-14 ENCOUNTER — APPOINTMENT (OUTPATIENT)
Dept: CT IMAGING | Facility: IMAGING CENTER | Age: 67
End: 2022-06-14
Payer: COMMERCIAL

## 2022-06-14 ENCOUNTER — OUTPATIENT (OUTPATIENT)
Dept: OUTPATIENT SERVICES | Facility: HOSPITAL | Age: 67
LOS: 1 days | End: 2022-06-14
Payer: COMMERCIAL

## 2022-06-14 DIAGNOSIS — R31.0 GROSS HEMATURIA: ICD-10-CM

## 2022-06-14 DIAGNOSIS — Z00.8 ENCOUNTER FOR OTHER GENERAL EXAMINATION: ICD-10-CM

## 2022-06-14 PROBLEM — R10.9 ABDOMINAL PAIN: Status: ACTIVE | Noted: 2022-06-03

## 2022-06-14 PROCEDURE — 74178 CT ABD&PLV WO CNTR FLWD CNTR: CPT | Mod: 26

## 2022-06-14 PROCEDURE — 74178 CT ABD&PLV WO CNTR FLWD CNTR: CPT

## 2022-06-14 NOTE — REVIEW OF SYSTEMS
[Abdominal Pain] : abdominal pain [Blood in urine that you can see] : blood visible in urine [Negative] : Heme/Lymph [Feeling Poorly] : not feeling poorly [Feeling Tired] : not feeling tired

## 2022-06-14 NOTE — ASSESSMENT
[FreeTextEntry1] : 06/06/2022:  is a 65 y/o M that presents today for evaluation of terminal gross hematuria. He reports that on 6/3 and 6/4 he experienced some degree of uncontrolled urination. At conclusion he had some minor pain in the penis and noticed some blood in his urine. Denies any flank or lower back pain. Did not see any stones come out. Has never had anything like this before. Is now fully recovered without intervention and feels well. Prior to his episode he was away on a business trip, drove 4 hours home, had a strong urge to urinate and was surprised at how uncontrolled it was. Often takes long drives so the drive was nothing new to him. Pt has seen  for almost 30 years, advised the patient to see me since there was blood in his urine. Prior to this episode of uncontrolled urination, he reports nocturia x1-2. Denies urinary urgency, pushing, straining, burning. The pt takes baby aspirin around 4-5x a week. Had a TIA a few years ago, couldn't see out of left eye for 2 minutes. Hx of panic attack due to work stress prior to TIA. Has not consulted with his neurologist if he should be taking baby aspirin every day or 4-5x. Patient has now cut back on his workload. Pt is not allergic to contrast. Recently had a colonoscopy. Pt also reports that he had about 2-3 episodes over a 9 month period dating back to late last summer of lower groin pain. These episodes lasted 1-2 hours and resolved on its own, appears to be gastrointestinal. Pt exercises often, does 35 sit ups every morning. \par \par The patient produced a urine sample which will be sent for urinalysis, urine cytology, and urine culture. \par Reviewed and discussed laboratory work ordered by  on 6/3/2022. At that time PSA was 3.35. Creatinine was 1.05. \par My recommendation was that the patient have a cystoscopy. The pt was fearful of the procedure and therefore I am prescribing him Valium 10 mg to be taken prior. Given the patient's hx of panic attack, I advised him to try taking one tablet when he has nothing to do to to see if it will be sufficient enough to relax him for the procedure. I advised the patient to eat on the day of the procedure. If he has the procedure with Valium, he is aware he needs someone to drive him since Valium lasts 6 hours. I informed the patient we will insert lidocaine for local anesthesia. I discussed the risk of infection, but informed the patient that we will provide an antibiotic after the procedure and at bedtime. I discussed the risk of urinary ry retention and gross hematuria.\par I am sending the patient for a CT abdomen and pelvis w/wo IV contrast. \par I advised the patient to see his neurologist to ask whether or not he should be taking baby aspirin daily. \par Patient will RTO next week for cystoscopy and to review the results of his CT.\par \par 06/09/2022: Patient presents today for a follow up telephone visit for which he gave permission for. UA of 6/6/2022 revealed 6 RBC/HPF. Cytology of same date demonstrated atypical urothelial cells, singly and in groups, consistent with high grade urothelial carcinoma. Pt was instructed by my nurse to schedule CT Urogram and cystoscopy promptly. I called the patient on his house phone first and discussed things with his wife. Patient was reached on his cell phone. Pt denies seeing any more blood since he saw me. Urination is pretty good\par \par Patient is scheduled for CT on 6/14/2022. Will schedule and return for cystoscopy on 6/16/2022 at 8 AM. Pt will take valium beforehand, wife will drive him. I explained that urine cytology have a low false positivity rate. While there is chance there is nothing going on, there is about an 85% chance there is a tumor. I explained that most likely if it is found to be a bladder tumor, he would need a TURBT. Rationale, benefit, risk, and alternatives reviewed. Discussed possibility of BCG treatments. All questions were answered. Pt understood. I informed the patient that I will discuss what is going on with . \par \par Encouraged the patient to drink plenty of water and get rest. Avoid strenuous activities.  \par \par Patient will RTO for cystoscopy next week. \par \par Duration of telephone visit: 22 minutes.

## 2022-06-14 NOTE — HISTORY OF PRESENT ILLNESS
[FreeTextEntry1] : 06/06/2022:  is a 65 y/o M that presents today for evaluation of terminal gross hematuria. He reports that on 6/3 and 6/4 he experienced some degree of uncontrolled urination. At conclusion he had some minor pain in the penis and noticed some blood in his urine. Denies any flank or lower back pain. Did not see any stones come out. Has never had anything like this before. Is now fully recovered without intervention and feels well. Prior to his episode he was away on a business trip, drove 4 hours home, had a strong urge to urinate and was surprised at how uncontrolled it was. Often takes long drives so the drive was nothing new to him. Pt has seen  for almost 30 years, advised the patient to see me since there was blood in his urine. Prior to this episode of uncontrolled urination, he reports nocturia x1-2. Denies urinary urgency, pushing, straining, burning. The pt takes baby aspirin around 4-5x a week. Had a TIA a few years ago, couldn't see out of left eye for 2 minutes. Hx of panic attack due to work stress prior to TIA. Has not consulted with his neurologist if he should be taking baby aspirin every day or 4-5x. Patient has now cut back on his workload. Pt is not allergic to contrast. Recently had a colonoscopy. Pt also reports that he had about 2-3 episodes over a 9 month period dating back to late last summer of lower groin pain. These episodes lasted 1-2 hours and resolved on its own, appears to be gastrointestinal.  Pt exercises often, does 35 sit ups every morning. \par \par 06/09/2022: Patient presents today for a follow up telephone visit for which he gave permission for. UA of 6/6/2022 revealed 6 RBC/HPF. Cytology of same date demonstrated atypical urothelial cells, singly and in groups, consistent with high grade urothelial carcinoma. Pt was instructed by my nurse to schedule CT Urogram and cystoscopy promptly. I called the patient on his house phone first and discussed things with his wife. Patient was reached on his cell phone. Pt denies seeing any more blood since he saw me. Urination is pretty good\par

## 2022-06-14 NOTE — ADDENDUM
[FreeTextEntry1] : This note was authored by Teena Ralph working as a scribe for Dr.Gary Parra. I, Dr. Jesus Parra have reviewed the content of this note and confirm it is true and accurate. I personally performed the history and physical examination and made all the decisions 06/09/2022.

## 2022-06-16 ENCOUNTER — APPOINTMENT (OUTPATIENT)
Dept: UROLOGY | Facility: CLINIC | Age: 67
End: 2022-06-16
Payer: COMMERCIAL

## 2022-06-16 VITALS
SYSTOLIC BLOOD PRESSURE: 119 MMHG | TEMPERATURE: 97 F | HEIGHT: 69 IN | DIASTOLIC BLOOD PRESSURE: 78 MMHG | WEIGHT: 149 LBS | BODY MASS INDEX: 22.07 KG/M2 | HEART RATE: 72 BPM | RESPIRATION RATE: 17 BRPM | OXYGEN SATURATION: 97 %

## 2022-06-16 DIAGNOSIS — N39.41 URGE INCONTINENCE: ICD-10-CM

## 2022-06-16 PROCEDURE — 99215 OFFICE O/P EST HI 40 MIN: CPT | Mod: 25

## 2022-06-16 PROCEDURE — 52000 CYSTOURETHROSCOPY: CPT

## 2022-06-16 NOTE — HISTORY OF PRESENT ILLNESS
[FreeTextEntry1] : 06/06/2022:  is a 65 y/o M that presents today for evaluation of terminal gross hematuria. He reports that on 6/3 and 6/4 he experienced some degree of uncontrolled urination. At conclusion he had some minor pain in the penis and noticed some blood in his urine. Denies any flank or lower back pain. Did not see any stones come out. Has never had anything like this before. Is now fully recovered without intervention and feels well. Prior to his episode he was away on a business trip, drove 4 hours home, had a strong urge to urinate and was surprised at how uncontrolled it was. Often takes long drives so the drive was nothing new to him. Pt has seen  for almost 30 years, advised the patient to see me since there was blood in his urine. Prior to this episode of uncontrolled urination, he reports nocturia x1-2. Denies urinary urgency, pushing, straining, burning. The pt takes baby aspirin around 4-5x a week. Had a TIA a few years ago, couldn't see out of left eye for 2 minutes. Hx of panic attack due to work stress prior to TIA. Has not consulted with his neurologist if he should be taking baby aspirin every day or 4-5x. Patient has now cut back on his workload. Pt is not allergic to contrast. Recently had a colonoscopy. Pt also reports that he had about 2-3 episodes over a 9 month period dating back to late last summer of lower groin pain. These episodes lasted 1-2 hours and resolved on its own, appears to be gastrointestinal.  Pt exercises often, does 35 sit ups every morning. \par \par 06/09/2022: Patient presents today for a follow up telephone visit for which he gave permission for. UA of 6/6/2022 revealed 6 RBC/HPF. Cytology of same date demonstrated atypical urothelial cells, singly and in groups, consistent with high grade urothelial carcinoma. Pt was instructed by my nurse to schedule CT Urogram and cystoscopy promptly. I called the patient on his house phone first and discussed things with his wife. Patient was reached on his cell phone. Pt denies seeing any more blood since he saw me. Urination is pretty good. \par \par 06/16/2022:  presents today for a cystoscopy to evaluate abnormal urine cytology of 6/6/2022 which demonstrated atypical urothelial cells, singly and in groups, consistent with high grade urothelial carcinoma. Patient obtained a CT abdomen and pelvis w/wo contrast on 6/14/2022 which demonstrated 2mm punctate nonobstructing stone in the right kidney. No other abnormality of the kidneys and urinary bladder. Enlarged prostate gland. \par \par Cystoscopy: The patient took one tablet of Valium 10 mg as prescribed prior to the procedure. Lidocaine jelly was inserted for numbing and lubrication. Bladder mucosa is a normal pale pink color. Right ureteral orifice is a slit but rounder when it opens, clear efflux. Normal position on the trigone. Left ureteral orifice is an oval shape, a bit wider with clear efflux seen. The bladder is 1+ trabeculated. Very tiny median lobe protruding a short distance into the bladder. Interureteric Ridge is a safe 2 cm from the prostate. Upon retroflexing the cystoscope, there is a tiny carpet of papillary tumor, about 5-6 foci that are merged together on left anterior wall, about midway form the bladder neck to the most superior part of the bladder. Little ulcerated area right next to it. Right side midway between the ridge and the prostate has a little tuft of tiny little balls on stalks about 1 cm across, biggest one is about 6 mm high off the bladder floor. Good distance from the right ureteral orifice. Little red spot 2 mm on interureteric ridge. Minimal hypertrophy with a few inflammatory polyps in the mid prostatic urethra. The patient took one Bactrim tablet at the time of the procedure and will take one before bed. The patient tolerated the procedure well and was in good spirits.

## 2022-06-16 NOTE — ADDENDUM
[FreeTextEntry1] : This note was authored by Teena Ralph working as a scribe for Dr.Gary Parra. I, Dr. Jesus Parra have reviewed the content of this note and confirm it is true and accurate. I personally performed the history and physical examination and made all the decisions 06/16/2022.

## 2022-06-16 NOTE — ASSESSMENT
[FreeTextEntry1] : 06/06/2022:  is a 67 y/o M that presents today for evaluation of terminal gross hematuria. He reports that on 6/3 and 6/4 he experienced some degree of uncontrolled urination. At conclusion he had some minor pain in the penis and noticed some blood in his urine. Denies any flank or lower back pain. Did not see any stones come out. Has never had anything like this before. Is now fully recovered without intervention and feels well. Prior to his episode he was away on a business trip, drove 4 hours home, had a strong urge to urinate and was surprised at how uncontrolled it was. Often takes long drives so the drive was nothing new to him. Pt has seen  for almost 30 years, advised the patient to see me since there was blood in his urine. Prior to this episode of uncontrolled urination, he reports nocturia x1-2. Denies urinary urgency, pushing, straining, burning. The pt takes baby aspirin around 4-5x a week. Had a TIA a few years ago, couldn't see out of left eye for 2 minutes. Hx of panic attack due to work stress prior to TIA. Has not consulted with his neurologist if he should be taking baby aspirin every day or 4-5x. Patient has now cut back on his workload. Pt is not allergic to contrast. Recently had a colonoscopy. Pt also reports that he had about 2-3 episodes over a 9 month period dating back to late last summer of lower groin pain. These episodes lasted 1-2 hours and resolved on its own, appears to be gastrointestinal. Pt exercises often, does 35 sit ups every morning. \par \par The patient produced a urine sample which will be sent for urinalysis, urine cytology, and urine culture. \par Reviewed and discussed laboratory work ordered by  on 6/3/2022. At that time PSA was 3.35. Creatinine was 1.05. \par My recommendation was that the patient have a cystoscopy. The pt was fearful of the procedure and therefore I am prescribing him Valium 10 mg to be taken prior. Given the patient's hx of panic attack, I advised him to try taking one tablet when he has nothing to do to to see if it will be sufficient enough to relax him for the procedure. I advised the patient to eat on the day of the procedure. If he has the procedure with Valium, he is aware he needs someone to drive him since Valium lasts 6 hours. I informed the patient we will insert lidocaine for local anesthesia. I discussed the risk of infection, but informed the patient that we will provide an antibiotic after the procedure and at bedtime. I discussed the risk of urinary ry retention and gross hematuria.\par I am sending the patient for a CT abdomen and pelvis w/wo IV contrast. \par I advised the patient to see his neurologist to ask whether or not he should be taking baby aspirin daily. \par Patient will RTO next week for cystoscopy and to review the results of his CT.\par \par 06/09/2022: Patient presents today for a follow up telephone visit for which he gave permission for. UA of 6/6/2022 revealed 6 RBC/HPF. Cytology of same date demonstrated atypical urothelial cells, singly and in groups, consistent with high grade urothelial carcinoma. Pt was instructed by my nurse to schedule CT Urogram and cystoscopy promptly. I called the patient on his house phone first and discussed things with his wife. Patient was reached on his cell phone. Pt denies seeing any more blood since he saw me. Urination is pretty good\par \par Patient is scheduled for CT on 6/14/2022. Will schedule and return for cystoscopy on 6/16/2022 at 8 AM. Pt will take valium beforehand, wife will drive him. I explained that urine cytology have a low false positivity rate. While there is chance there is nothing going on, there is about an 85% chance there is a tumor. I explained that most likely if it is found to be a bladder tumor, he would need a TURBT. Rationale, benefit, risk, and alternatives reviewed. Discussed possibility of BCG treatments. All questions were answered. Pt understood. I informed the patient that I will discuss what is going on with . \par Encouraged the patient to drink plenty of water and get rest. Avoid strenuous activities.  \par Patient will RTO for cystoscopy next week. \par \par 06/16/2022:  presents today for a cystoscopy to evaluate abnormal urine cytology of 6/6/2022 which demonstrated atypical urothelial cells, singly and in groups, consistent with high grade urothelial carcinoma. Patient obtained a CT abdomen and pelvis w/wo contrast on 6/14/2022 which demonstrated 2mm punctate nonobstructing stone in the right kidney. No other abnormality of the kidneys and urinary bladder. Enlarged prostate gland. \par \par Cystoscopy: The patient took one tablet of Valium 10 mg as prescribed prior to the procedure. Lidocaine jelly was inserted for numbing and lubrication. Bladder mucosa is a normal pale pink color. Right ureteral orifice is a slit but rounder when it opens, clear efflux. Normal position on the trigone. Left ureteral orifice is an oval shape, a bit wider with clear efflux seen. The bladder is 1+ trabeculated. Very tiny median lobe protruding a short distance into the bladder. Interureteric Ridge is a safe 2 cm from the prostate. Upon retroflexing the cystoscope, there is a tiny carpet of papillary tumor, about 5-6 foci that are merged together on left anterior wall, about midway form the bladder neck to the most superior part of the bladder. Little ulcerated area right next to it. Right side midway between the ridge and the prostate has a little tuft of tiny little balls on stalks about 1 cm across, biggest one is about 6 mm high off the bladder floor. Good distance from the right ureteral orifice. Little red spot 2 mm on interureteric ridge. Minimal hypertrophy with a few inflammatory polyps in the mid prostatic urethra. The patient took one Bactrim tablet at the time of the procedure and will take one before bed. The patient tolerated the procedure well and was in good spirits.\par \par Clinical findings and CT results were reviewed at length with the patient and his wife. While the CT did not reveal any bladder tumors, due to the abnormal urine cytology findings I felt confident there would be something found on cystoscopy. Advised the patient to increase his water consumption to avoid stone growth and formation. \par \par I reviewed the findings of the cystoscopy with the patient and his wife in detail. The patient will undergo a transurethral resection of bladder tumors with Dr.Jay Lemos. An email was sent to , his  Olena Pacheco, and OR booking  Madalyn to request he be scheduled. I explained to him there is a small chance he will need a catheter after the procedure. Rationale, benefit, risk, and alternatives reviewed. All questions were answered. Pt and wife understood. We discussed BCG treatments or gemcitabine pending pathology, once a week for 6 weeks once recovered from tumor resection. Re cystoscope 3 months from resection. I informed the patient of urgency, frequency, and irritability of the bladder as side effects. I informed the patient the treatment doesn't eliminate the chance of reoccurring bladder tumors, it helps decrease the chance of future tumors. \par \par The patient would like to meet with  prior to the procedure. I explained to him that after the procedure he will follow up with  and then return to me to discuss either BCG treatment or gemcitabine. \par \par Preparation, in person office visit, and coordination of care: 40 minutes exclusive of time for cystoscopy.

## 2022-06-18 LAB
APPEARANCE: ABNORMAL
BACTERIA UR CULT: NORMAL
BACTERIA: NEGATIVE
BILIRUBIN URINE: NEGATIVE
BLOOD URINE: ABNORMAL
COLOR: YELLOW
GLUCOSE QUALITATIVE U: NORMAL
HYALINE CASTS: 0 /LPF
KETONES URINE: NEGATIVE
LEUKOCYTE ESTERASE URINE: NEGATIVE
MICROSCOPIC-UA: NORMAL
NITRITE URINE: NEGATIVE
PH URINE: 6
PROTEIN URINE: NORMAL
RED BLOOD CELLS URINE: 1 /HPF
SPECIFIC GRAVITY URINE: 1.02
SQUAMOUS EPITHELIAL CELLS: 0 /HPF
URINE CYTOLOGY: NORMAL
UROBILINOGEN URINE: NORMAL
WHITE BLOOD CELLS URINE: 0 /HPF

## 2022-06-22 ENCOUNTER — APPOINTMENT (OUTPATIENT)
Dept: UROLOGY | Facility: CLINIC | Age: 67
End: 2022-06-22
Payer: COMMERCIAL

## 2022-06-22 VITALS
SYSTOLIC BLOOD PRESSURE: 144 MMHG | RESPIRATION RATE: 16 BRPM | DIASTOLIC BLOOD PRESSURE: 77 MMHG | TEMPERATURE: 97.5 F | HEART RATE: 77 BPM

## 2022-06-22 PROCEDURE — 99215 OFFICE O/P EST HI 40 MIN: CPT

## 2022-06-22 NOTE — HISTORY OF PRESENT ILLNESS
[FreeTextEntry1] : 6/06/2022:  is a 67 y/o M that presents today for evaluation of terminal gross hematuria. He reports that on 6/3 and 6/4 he experienced some degree of uncontrolled urination. At conclusion he had some minor pain in the penis and noticed some blood in his urine. Denies any flank or lower back pain. Did not see any stones come out. Has never had anything like this before. Is now fully recovered without intervention and feels well. Prior to his episode he was away on a business trip, drove 4 hours home, had a strong urge to urinate and was surprised at how uncontrolled it was. Often takes long drives so the drive was nothing new to him. Pt has seen  for almost 30 years, advised the patient to see me since there was blood in his urine. Prior to this episode of uncontrolled urination, he reports nocturia x1-2. Denies urinary urgency, pushing, straining, burning. The pt takes baby aspirin around 4-5x a week. Had a TIA a few years ago, couldn't see out of left eye for 2 minutes. Hx of panic attack due to work stress prior to TIA. Has not consulted with his neurologist if he should be taking baby aspirin every day or 4-5x. Patient has now cut back on his workload. Pt is not allergic to contrast. Recently had a colonoscopy. Pt also reports that he had about 2-3 episodes over a 9 month period dating back to late last summer of lower groin pain. These episodes lasted 1-2 hours and resolved on its own, appears to be gastrointestinal. Pt exercises often, does 35 sit ups every morning. \par \par 06/09/2022: Patient presents today for a follow up telephone visit for which he gave permission for. UA of 6/6/2022 revealed 6 RBC/HPF. Cytology of same date demonstrated atypical urothelial cells, singly and in groups, consistent with high grade urothelial carcinoma. Pt was instructed by my nurse to schedule CT Urogram and cystoscopy promptly. I called the patient on his house phone first and discussed things with his wife. Patient was reached on his cell phone. Pt denies seeing any more blood since he saw me. Urination is pretty good. \par \par 06/16/2022:  presents today for a cystoscopy to evaluate abnormal urine cytology of 6/6/2022 which demonstrated atypical urothelial cells, singly and in groups, consistent with high grade urothelial carcinoma. Patient obtained a CT abdomen and pelvis w/wo contrast on 6/14/2022 which demonstrated 2mm punctate nonobstructing stone in the right kidney. No other abnormality of the kidneys and urinary bladder. Enlarged prostate gland. \par \par Cystoscopy: The patient took one tablet of Valium 10 mg as prescribed prior to the procedure. Lidocaine jelly was inserted for numbing and lubrication. Bladder mucosa is a normal pale pink color. Right ureteral orifice is a slit but rounder when it opens, clear efflux. Normal position on the trigone. Left ureteral orifice is an oval shape, a bit wider with clear efflux seen. The bladder is 1+ trabeculated. Very tiny median lobe protruding a short distance into the bladder. Interureteric Ridge is a safe 2 cm from the prostate. Upon retroflexing the cystoscope, there is a tiny carpet of papillary tumor, about 5-6 foci that are merged together on left anterior wall, about midway form the bladder neck to the most superior part of the bladder. Little ulcerated area right next to it. Right side midway between the ridge and the prostate has a little tuft of tiny little balls on stalks about 1 cm across, biggest one is about 6 mm high off the bladder floor. Good distance from the right ureteral orifice. Little red spot 2 mm on interureteric ridge. Minimal hypertrophy with a few inflammatory polyps in the mid prostatic urethra. The patient took one Bactrim tablet at the time of the procedure and will take one before bed. The patient tolerated the procedure well and was in good spirits. \par \par Bladder cancer:  Plan TURBT and Gemcitabine chemotherapy, has LUTS needs Finasteride for his LUTS and prostate enlargmeent. \par

## 2022-07-08 ENCOUNTER — APPOINTMENT (OUTPATIENT)
Dept: UROLOGY | Facility: CLINIC | Age: 67
End: 2022-07-08

## 2022-07-18 ENCOUNTER — OUTPATIENT (OUTPATIENT)
Dept: OUTPATIENT SERVICES | Facility: HOSPITAL | Age: 67
LOS: 1 days | End: 2022-07-18

## 2022-07-18 VITALS
TEMPERATURE: 97 F | WEIGHT: 149.03 LBS | HEART RATE: 63 BPM | HEIGHT: 70 IN | OXYGEN SATURATION: 98 % | RESPIRATION RATE: 16 BRPM

## 2022-07-18 DIAGNOSIS — J45.901 UNSPECIFIED ASTHMA WITH (ACUTE) EXACERBATION: ICD-10-CM

## 2022-07-18 DIAGNOSIS — Z01.812 ENCOUNTER FOR PREPROCEDURAL LABORATORY EXAMINATION: ICD-10-CM

## 2022-07-18 DIAGNOSIS — Z98.890 OTHER SPECIFIED POSTPROCEDURAL STATES: Chronic | ICD-10-CM

## 2022-07-18 DIAGNOSIS — D49.4 NEOPLASM OF UNSPECIFIED BEHAVIOR OF BLADDER: ICD-10-CM

## 2022-07-18 DIAGNOSIS — G45.9 TRANSIENT CEREBRAL ISCHEMIC ATTACK, UNSPECIFIED: ICD-10-CM

## 2022-07-18 LAB
ANION GAP SERPL CALC-SCNC: 10 MMOL/L — SIGNIFICANT CHANGE UP (ref 7–14)
BUN SERPL-MCNC: 15 MG/DL — SIGNIFICANT CHANGE UP (ref 7–23)
CALCIUM SERPL-MCNC: 9.2 MG/DL — SIGNIFICANT CHANGE UP (ref 8.4–10.5)
CHLORIDE SERPL-SCNC: 103 MMOL/L — SIGNIFICANT CHANGE UP (ref 98–107)
CO2 SERPL-SCNC: 26 MMOL/L — SIGNIFICANT CHANGE UP (ref 22–31)
CREAT SERPL-MCNC: 1.01 MG/DL — SIGNIFICANT CHANGE UP (ref 0.5–1.3)
EGFR: 82 ML/MIN/1.73M2 — SIGNIFICANT CHANGE UP
GLUCOSE SERPL-MCNC: 109 MG/DL — HIGH (ref 70–99)
HCT VFR BLD CALC: 44 % — SIGNIFICANT CHANGE UP (ref 39–50)
HGB BLD-MCNC: 14.7 G/DL — SIGNIFICANT CHANGE UP (ref 13–17)
MCHC RBC-ENTMCNC: 31.5 PG — SIGNIFICANT CHANGE UP (ref 27–34)
MCHC RBC-ENTMCNC: 33.4 GM/DL — SIGNIFICANT CHANGE UP (ref 32–36)
MCV RBC AUTO: 94.4 FL — SIGNIFICANT CHANGE UP (ref 80–100)
NRBC # BLD: 0 /100 WBCS — SIGNIFICANT CHANGE UP
NRBC # FLD: 0 K/UL — SIGNIFICANT CHANGE UP
PLATELET # BLD AUTO: 221 K/UL — SIGNIFICANT CHANGE UP (ref 150–400)
POTASSIUM SERPL-MCNC: 4.4 MMOL/L — SIGNIFICANT CHANGE UP (ref 3.5–5.3)
POTASSIUM SERPL-SCNC: 4.4 MMOL/L — SIGNIFICANT CHANGE UP (ref 3.5–5.3)
RBC # BLD: 4.66 M/UL — SIGNIFICANT CHANGE UP (ref 4.2–5.8)
RBC # FLD: 13.1 % — SIGNIFICANT CHANGE UP (ref 10.3–14.5)
SODIUM SERPL-SCNC: 139 MMOL/L — SIGNIFICANT CHANGE UP (ref 135–145)
WBC # BLD: 4.12 K/UL — SIGNIFICANT CHANGE UP (ref 3.8–10.5)
WBC # FLD AUTO: 4.12 K/UL — SIGNIFICANT CHANGE UP (ref 3.8–10.5)

## 2022-07-18 PROCEDURE — 93010 ELECTROCARDIOGRAM REPORT: CPT

## 2022-07-18 NOTE — H&P PST ADULT - PUPIL L
Odomzo Pregnancy And Lactation Text: This medication is Pregnancy Category X and is absolutely contraindicated during pregnancy. It is unknown if it is excreted in breast milk. round/brisk

## 2022-07-18 NOTE — H&P PST ADULT - NSANTHOSAYNRD_GEN_A_CORE
No. MECHELLE screening performed.  STOP BANG Legend: 0-2 = LOW Risk; 3-4 = INTERMEDIATE Risk; 5-8 = HIGH Risk

## 2022-07-18 NOTE — H&P PST ADULT - NEGATIVE GENERAL GENITOURINARY SYMPTOMS
no hematuria/no renal colic/no flank pain L/no flank pain R/no bladder infections no renal colic/no flank pain L/no flank pain R/no bladder infections

## 2022-07-18 NOTE — H&P PST ADULT - NSICDXPASTMEDICALHX_GEN_ALL_CORE_FT
PAST MEDICAL HISTORY:  No pertinent past medical history      PAST MEDICAL HISTORY:  Anxiety     Neoplasm of unspecified behavior of bladder     Stroke h/o mini ?2016

## 2022-07-18 NOTE — H&P PST ADULT - HISTORY OF PRESENT ILLNESS
68y/o male presents for preop eval for scheduled cystostopy TURBT.  Pt with h/o hematuria.  Referred to urologist, further testing done.  Preop dx  68y/o male presents for preop eval for scheduled cystostopy TURBT.  Pt with h/o hematuria.  Referred to urologist, further testing done.   Pt states "they found abnormal cells". Preop dx neoplasm of unspecified behavior of bladder.

## 2022-07-18 NOTE — H&P PST ADULT - NSICDXPASTSURGICALHX_GEN_ALL_CORE_FT
PAST SURGICAL HISTORY:  No significant past surgical history      PAST SURGICAL HISTORY:  H/O hernia repair 2007

## 2022-07-19 LAB
CULTURE RESULTS: NO GROWTH — SIGNIFICANT CHANGE UP
SPECIMEN SOURCE: SIGNIFICANT CHANGE UP

## 2022-07-21 DIAGNOSIS — Z01.818 ENCOUNTER FOR OTHER PREPROCEDURAL EXAMINATION: ICD-10-CM

## 2022-07-22 ENCOUNTER — NON-APPOINTMENT (OUTPATIENT)
Age: 67
End: 2022-07-22

## 2022-07-22 ENCOUNTER — APPOINTMENT (OUTPATIENT)
Dept: INTERNAL MEDICINE | Facility: CLINIC | Age: 67
End: 2022-07-22

## 2022-07-22 VITALS
WEIGHT: 147 LBS | DIASTOLIC BLOOD PRESSURE: 70 MMHG | HEIGHT: 69 IN | SYSTOLIC BLOOD PRESSURE: 130 MMHG | BODY MASS INDEX: 21.77 KG/M2

## 2022-07-22 PROCEDURE — 93000 ELECTROCARDIOGRAM COMPLETE: CPT | Mod: 59

## 2022-07-22 PROCEDURE — 99214 OFFICE O/P EST MOD 30 MIN: CPT | Mod: 25

## 2022-07-22 NOTE — REVIEW OF SYSTEMS
[Hesitancy] : hesitancy [Hematuria] : hematuria [Frequency] : frequency [Negative] : Gastrointestinal

## 2022-07-22 NOTE — ASSESSMENT
[Patient Optimized for Surgery] : Patient optimized for surgery [No Further Testing Recommended] : no further testing recommended [As per surgery] : as per surgery [FreeTextEntry4] : The patient's blood pressure and physical examination was normal.  His preop testing was also normal he is medically cleared for surgery

## 2022-07-22 NOTE — HISTORY OF PRESENT ILLNESS
[No Pertinent Cardiac History] : no history of aortic stenosis, atrial fibrillation, coronary artery disease, recent myocardial infarction, or implantable device/pacemaker [No Pertinent Pulmonary History] : no history of asthma, COPD, sleep apnea, or smoking [No Adverse Anesthesia Reaction] : no adverse anesthesia reaction in self or family member [FreeTextEntry1] : TUBR [FreeTextEntry4] : This is a 67-year-old gentleman who is scheduled for transurethral bladder tumor resection.  The patient is otherwise in good health other than having a mild panic disorder and alopecia [FreeTextEntry3] : Dr Eli

## 2022-08-01 LAB — SARS-COV-2 N GENE NPH QL NAA+PROBE: NOT DETECTED

## 2022-08-02 ENCOUNTER — TRANSCRIPTION ENCOUNTER (OUTPATIENT)
Age: 67
End: 2022-08-02

## 2022-08-02 VITALS
HEART RATE: 67 BPM | WEIGHT: 149.03 LBS | OXYGEN SATURATION: 99 % | TEMPERATURE: 99 F | SYSTOLIC BLOOD PRESSURE: 129 MMHG | DIASTOLIC BLOOD PRESSURE: 68 MMHG | HEIGHT: 70 IN | RESPIRATION RATE: 18 BRPM

## 2022-08-02 NOTE — ASU PREOPERATIVE ASSESSMENT, ADULT ([facility] ONLY) - FALL HARM RISK - UNIVERSAL INTERVENTIONS
Bed in lowest position, wheels locked, appropriate side rails in place/Call bell, personal items and telephone in reach/Instruct patient to call for assistance before getting out of bed or chair/Non-slip footwear when patient is out of bed/[place]well to call system/Physically safe environment - no spills, clutter or unnecessary equipment/Purposeful Proactive Rounding/Room/bathroom lighting operational, light cord in reach

## 2022-08-03 ENCOUNTER — RESULT REVIEW (OUTPATIENT)
Age: 67
End: 2022-08-03

## 2022-08-03 ENCOUNTER — APPOINTMENT (OUTPATIENT)
Dept: UROLOGY | Facility: AMBULATORY SURGERY CENTER | Age: 67
End: 2022-08-03

## 2022-08-03 ENCOUNTER — OUTPATIENT (OUTPATIENT)
Dept: OUTPATIENT SERVICES | Facility: HOSPITAL | Age: 67
LOS: 1 days | Discharge: HOME | End: 2022-08-03

## 2022-08-03 ENCOUNTER — TRANSCRIPTION ENCOUNTER (OUTPATIENT)
Age: 67
End: 2022-08-03

## 2022-08-03 VITALS
OXYGEN SATURATION: 100 % | TEMPERATURE: 98 F | RESPIRATION RATE: 16 BRPM | HEART RATE: 58 BPM | SYSTOLIC BLOOD PRESSURE: 149 MMHG | DIASTOLIC BLOOD PRESSURE: 79 MMHG

## 2022-08-03 DIAGNOSIS — Z98.890 OTHER SPECIFIED POSTPROCEDURAL STATES: Chronic | ICD-10-CM

## 2022-08-03 DIAGNOSIS — D49.4 NEOPLASM OF UNSPECIFIED BEHAVIOR OF BLADDER: ICD-10-CM

## 2022-08-03 PROCEDURE — 88307 TISSUE EXAM BY PATHOLOGIST: CPT | Mod: 26

## 2022-08-03 PROCEDURE — 51720 TREATMENT OF BLADDER LESION: CPT | Mod: 59

## 2022-08-03 PROCEDURE — 52235 CYSTOSCOPY AND TREATMENT: CPT

## 2022-08-03 RX ORDER — GEMCITABINE 38 MG/ML
2000 INJECTION, SOLUTION INTRAVENOUS ONCE
Refills: 0 | Status: DISCONTINUED | OUTPATIENT
Start: 2022-08-03 | End: 2022-08-18

## 2022-08-03 NOTE — ASU DISCHARGE PLAN (ADULT/PEDIATRIC) - CARE PROVIDER_API CALL
Migue Lemos)  Urology  [phone] [place] [address]  Phone: [phone]  Fax: [phone]  Follow Up Time:

## 2022-08-03 NOTE — ASU DISCHARGE PLAN (ADULT/PEDIATRIC) - ASU DC SPECIAL INSTRUCTIONSFT
CATHETER: Some patients are sent home with a corbett catheter, while others go home urinating on their own. If you still have a catheter, the nurses will review instructions and care before you go home. For men, you may have a prescription for lidocaine jelly to apply to the tip of your penis, as needed, for catheter related discomfort.  GENERAL: It is common to have blood in your urine after your procedure. It may be pink or even red; inform your doctor if you have a significant amount of clot in the urine or if you are unable to void at all or if your catheter stops draining. The urine may clear and then become bloody again especially as you are more physically active. It is not uncommon to have some burning when you urinate, this will gradually improve. With a catheter in place, it is not uncommon to have occasional leakage or urine or blood around the catheter. Please call your urologist if this is excessive and/or the urine is not draining through the catheter into the bag.  BATHING: You may shower or bathe. If going home with corbett, shower only until catheter is removed.  DIET: You may resume your regular diet and regular medication regimen.  PAIN: You may take Tylenol (acetaminophen) 650-975mg and/or Motrin (ibuprofen) 400-600mg, both available over the counter, for pain every 6 hours as needed. Do not exceed 4000mg of Tylenol (acetaminophen) daily. You may alternate these medications such that you take one or the other every 3 hours for around the clock pain coverage.  ANTIBIOTICS: You may be given a prescription for an antibiotic, please take this medication as instructed and be sure to complete the entire course.  STOOL SOFTENERS: Do not allow yourself to become constipated as straining may cause bleeding. Take stool softeners or a laxative (ex. Miralax, Colace, Senokot, ExLax, etc), available over the counter, if needed.  ACTIVITY: No heavy lifting or strenuous exercise until you are evaluated at your post-operative appointment. Otherwise, you may return to your usual level of physical activity.  FOLLOW-UP: If you did not already schedule your post-operative appointment, please call your urologist to schedule and follow-up appointment.  CALL YOUR UROLOGIST IF: You have any bleeding that does not stop, inability to void >8 hours, fever over 100.4 F, chills, persistent nausea/vomiting, changes in your incision concerning for infection, or if your pain is not controlled on your discharge pain medications. GENERAL: It is common to have blood in your urine after your procedure. It may be pink or even red; inform your doctor if you have a significant amount of clot in the urine or if you are unable to void at all or if your catheter stops draining. The urine may clear and then become bloody again especially as you are more physically active. It is not uncommon to have some burning when you urinate, this will gradually improve. With a catheter in place, it is not uncommon to have occasional leakage or urine or blood around the catheter. Please call your urologist if this is excessive and/or the urine is not draining through the catheter into the bag.  BATHING: You may shower or bathe. If going home with corbett, shower only until catheter is removed.  DIET: You may resume your regular diet and regular medication regimen.  PAIN: You may take Tylenol (acetaminophen) 650-975mg and/or Motrin (ibuprofen) 400-600mg, both available over the counter, for pain every 6 hours as needed. Do not exceed 4000mg of Tylenol (acetaminophen) daily. You may alternate these medications such that you take one or the other every 3 hours for around the clock pain coverage.  ANTIBIOTICS: You may be given a prescription for an antibiotic, please take this medication as instructed and be sure to complete the entire course.  STOOL SOFTENERS: Do not allow yourself to become constipated as straining may cause bleeding. Take stool softeners or a laxative (ex. Miralax, Colace, Senokot, ExLax, etc), available over the counter, if needed.  ACTIVITY: No heavy lifting or strenuous exercise until you are evaluated at your post-operative appointment. Otherwise, you may return to your usual level of physical activity.  FOLLOW-UP: If you did not already schedule your post-operative appointment, please call your urologist to schedule and follow-up appointment.  CALL YOUR UROLOGIST IF: You have any bleeding that does not stop, inability to void >8 hours, fever over 100.4 F, chills, persistent nausea/vomiting, changes in your incision concerning for infection, or if your pain is not controlled on your discharge pain medications.

## 2022-08-03 NOTE — ASU DISCHARGE PLAN (ADULT/PEDIATRIC) - NS MD DC FALL RISK RISK
For information on Fall & Injury Prevention, visit: [email or web address] OR  [email or web address] OR  https://www.cdc.gov/steadi/patient.html

## 2022-08-03 NOTE — BRIEF OPERATIVE NOTE - OPERATION/FINDINGS
Procedure: TURBT, intravesical gemcitabine instillation  Preop dx: bladder tumor  Postop dx: bladder tumor

## 2022-08-03 NOTE — ASU DISCHARGE PLAN (ADULT/PEDIATRIC) - NURSING INSTRUCTIONS
DO NOT take any Tylenol (Acetaminophen) or narcotics containing Tylenol until after 08:20pm. You received Tylenol during your operation and it can cause damage to your liver if too much is taken within a 24 hour time period.

## 2022-08-06 LAB — SURGICAL PATHOLOGY STUDY: SIGNIFICANT CHANGE UP

## 2022-08-17 PROBLEM — I63.9 CEREBRAL INFARCTION, UNSPECIFIED: Chronic | Status: ACTIVE | Noted: 2022-07-18

## 2022-08-17 PROBLEM — F41.9 ANXIETY DISORDER, UNSPECIFIED: Chronic | Status: ACTIVE | Noted: 2022-07-18

## 2022-08-17 PROBLEM — D49.4 NEOPLASM OF UNSPECIFIED BEHAVIOR OF BLADDER: Chronic | Status: ACTIVE | Noted: 2022-07-18

## 2022-08-25 ENCOUNTER — APPOINTMENT (OUTPATIENT)
Dept: CT IMAGING | Facility: IMAGING CENTER | Age: 67
End: 2022-08-25

## 2022-08-25 ENCOUNTER — OUTPATIENT (OUTPATIENT)
Dept: OUTPATIENT SERVICES | Facility: HOSPITAL | Age: 67
LOS: 1 days | End: 2022-08-25
Payer: COMMERCIAL

## 2022-08-25 DIAGNOSIS — D49.4 NEOPLASM OF UNSPECIFIED BEHAVIOR OF BLADDER: ICD-10-CM

## 2022-08-25 DIAGNOSIS — Z98.890 OTHER SPECIFIED POSTPROCEDURAL STATES: Chronic | ICD-10-CM

## 2022-08-25 DIAGNOSIS — Z00.8 ENCOUNTER FOR OTHER GENERAL EXAMINATION: ICD-10-CM

## 2022-08-25 PROCEDURE — 74178 CT ABD&PLV WO CNTR FLWD CNTR: CPT

## 2022-08-25 PROCEDURE — 74178 CT ABD&PLV WO CNTR FLWD CNTR: CPT | Mod: 26

## 2022-09-01 ENCOUNTER — NON-APPOINTMENT (OUTPATIENT)
Age: 67
End: 2022-09-01

## 2022-09-19 ENCOUNTER — NON-APPOINTMENT (OUTPATIENT)
Age: 67
End: 2022-09-19

## 2022-09-21 ENCOUNTER — APPOINTMENT (OUTPATIENT)
Dept: UROLOGY | Facility: CLINIC | Age: 67
End: 2022-09-21

## 2022-09-21 VITALS
DIASTOLIC BLOOD PRESSURE: 80 MMHG | RESPIRATION RATE: 16 BRPM | TEMPERATURE: 97.5 F | SYSTOLIC BLOOD PRESSURE: 131 MMHG | HEART RATE: 94 BPM

## 2022-09-21 PROCEDURE — 99024 POSTOP FOLLOW-UP VISIT: CPT

## 2022-09-21 NOTE — HISTORY OF PRESENT ILLNESS
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 Feels well, Hematuria resolved. \par \par PLAN : Cysto n 2 weeks, re resect and rebiopsy, BCG therapy . \par \par 6/06/2022:  is a 67 y/o M that presents today for evaluation of terminal gross hematuria. He reports that on 6/3 and 6/4 he experienced some degree of uncontrolled urination. At conclusion he had some minor pain in the penis and noticed some blood in his urine. Denies any flank or lower back pain. Did not see any stones come out. Has never had anything like this before. Is now fully recovered without intervention and feels well. Prior to his episode he was away on a business trip, drove 4 hours home, had a strong urge to urinate and was surprised at how uncontrolled it was. Often takes long drives so the drive was nothing new to him. Pt has seen  for almost 30 years, advised the patient to see me since there was blood in his urine. Prior to this episode of uncontrolled urination, he reports nocturia x1-2. Denies urinary urgency, pushing, straining, burning. The pt takes baby aspirin around 4-5x a week. Had a TIA a few years ago, couldn't see out of left eye for 2 minutes. Hx of panic attack due to work stress prior to TIA. Has not consulted with his neurologist if he should be taking baby aspirin every day or 4-5x. Patient has now cut back on his workload. Pt is not allergic to contrast. Recently had a colonoscopy. Pt also reports that he had about 2-3 episodes over a 9 month period dating back to late last summer of lower groin pain. These episodes lasted 1-2 hours and resolved on its own, appears to be gastrointestinal. Pt exercises often, does 35 sit ups every morning. \par \par 06/09/2022: Patient presents today for a follow up telephone visit for which he gave permission for. UA of 6/6/2022 revealed 6 RBC/HPF. Cytology of same date demonstrated atypical urothelial cells, singly and in groups, consistent with high grade urothelial carcinoma. Pt was instructed by my nurse to schedule CT Urogram and cystoscopy promptly. I called the patient on his house phone first and discussed things with his wife. Patient was reached on his cell phone. Pt denies seeing any more blood since he saw me. Urination is pretty good. \par \par 06/16/2022:  presents today for a cystoscopy to evaluate abnormal urine cytology of 6/6/2022 which demonstrated atypical urothelial cells, singly and in groups, consistent with high grade urothelial carcinoma. Patient obtained a CT abdomen and pelvis w/wo contrast on 6/14/2022 which demonstrated 2mm punctate nonobstructing stone in the right kidney. No other abnormality of the kidneys and urinary bladder. Enlarged prostate gland. \par \par Cystoscopy: The patient took one tablet of Valium 10 mg as prescribed prior to the procedure. Lidocaine jelly was inserted for numbing and lubrication. Bladder mucosa is a normal pale pink color. Right ureteral orifice is a slit but rounder when it opens, clear efflux. Normal position on the trigone. Left ureteral orifice is an oval shape, a bit wider with clear efflux seen. The bladder is 1+ trabeculated. Very tiny median lobe protruding a short distance into the bladder. Interureteric Ridge is a safe 2 cm from the prostate. Upon retroflexing the cystoscope, there is a tiny carpet of papillary tumor, about 5-6 foci that are merged together on left anterior wall, about midway form the bladder neck to the most superior part of the bladder. Little ulcerated area right next to it. Right side midway between the ridge and the prostate has a little tuft of tiny little balls on stalks about 1 cm across, biggest one is about 6 mm high off the bladder floor. Good distance from the right ureteral orifice. Little red spot 2 mm on interureteric ridge. Minimal hypertrophy with a few inflammatory polyps in the mid prostatic urethra. The patient took one Bactrim tablet at the time of the procedure and will take one before bed. The patient tolerated the procedure well and was in good spirits. \par \par Bladder cancer:  Plan TURBT and Gemcitabine chemotherapy, has LUTS needs Finasteride for his LUTS and prostate enlargmeent. \par \par 09/21/2022: Pt presents today post-TuRBT for a follow up.

## 2022-10-12 NOTE — PHYSICAL EXAM
[General Appearance - Well Developed] : well developed [General Appearance - Well Nourished] : well nourished [Normal Appearance] : normal appearance [Well Groomed] : well groomed [General Appearance - In No Acute Distress] : no acute distress [Abdomen Soft] : soft [Abdomen Tenderness] : non-tender [Costovertebral Angle Tenderness] : no ~M costovertebral angle tenderness [Edema] : no peripheral edema [] : no respiratory distress [Respiration, Rhythm And Depth] : normal respiratory rhythm and effort [Exaggerated Use Of Accessory Muscles For Inspiration] : no accessory muscle use [Oriented To Time, Place, And Person] : oriented to person, place, and time [Affect] : the affect was normal [Mood] : the mood was normal [Not Anxious] : not anxious [Normal Station and Gait] : the gait and station were normal for the patient's age [No Focal Deficits] : no focal deficits Erythromycin Pregnancy And Lactation Text: This medication is Pregnancy Category B and is considered safe during pregnancy. It is also excreted in breast milk.

## 2022-10-16 NOTE — H&P PST ADULT - PROBLEM SELECTOR PROBLEM 3
PAST MEDICAL HISTORY:  HTN (hypertension)     Insomnia     Major depression     Type 2 diabetes mellitus     
Mini stroke

## 2022-10-17 ENCOUNTER — APPOINTMENT (OUTPATIENT)
Dept: UROLOGY | Facility: CLINIC | Age: 67
End: 2022-10-17

## 2022-10-17 ENCOUNTER — OUTPATIENT (OUTPATIENT)
Dept: OUTPATIENT SERVICES | Facility: HOSPITAL | Age: 67
LOS: 1 days | End: 2022-10-17
Payer: COMMERCIAL

## 2022-10-17 VITALS — SYSTOLIC BLOOD PRESSURE: 129 MMHG | DIASTOLIC BLOOD PRESSURE: 84 MMHG | HEART RATE: 86 BPM

## 2022-10-17 DIAGNOSIS — R35.0 FREQUENCY OF MICTURITION: ICD-10-CM

## 2022-10-17 DIAGNOSIS — Z98.890 OTHER SPECIFIED POSTPROCEDURAL STATES: Chronic | ICD-10-CM

## 2022-10-17 PROCEDURE — 52000 CYSTOURETHROSCOPY: CPT

## 2022-10-17 PROCEDURE — 88112 CYTOPATH CELL ENHANCE TECH: CPT | Mod: 26

## 2022-10-21 DIAGNOSIS — C67.9 MALIGNANT NEOPLASM OF BLADDER, UNSPECIFIED: ICD-10-CM

## 2022-10-21 LAB — URINE CYTOLOGY: NORMAL

## 2022-10-24 ENCOUNTER — APPOINTMENT (OUTPATIENT)
Dept: UROLOGY | Facility: CLINIC | Age: 67
End: 2022-10-24

## 2022-11-07 ENCOUNTER — APPOINTMENT (OUTPATIENT)
Dept: UROLOGY | Facility: CLINIC | Age: 67
End: 2022-11-07

## 2022-11-16 ENCOUNTER — OUTPATIENT (OUTPATIENT)
Dept: OUTPATIENT SERVICES | Facility: HOSPITAL | Age: 67
LOS: 1 days | End: 2022-11-16

## 2022-11-16 VITALS
DIASTOLIC BLOOD PRESSURE: 78 MMHG | OXYGEN SATURATION: 99 % | SYSTOLIC BLOOD PRESSURE: 122 MMHG | HEIGHT: 69 IN | TEMPERATURE: 97 F | WEIGHT: 139.99 LBS | HEART RATE: 65 BPM | RESPIRATION RATE: 16 BRPM

## 2022-11-16 DIAGNOSIS — Z98.890 OTHER SPECIFIED POSTPROCEDURAL STATES: Chronic | ICD-10-CM

## 2022-11-16 DIAGNOSIS — Z01.812 ENCOUNTER FOR PREPROCEDURAL LABORATORY EXAMINATION: ICD-10-CM

## 2022-11-16 DIAGNOSIS — D49.4 NEOPLASM OF UNSPECIFIED BEHAVIOR OF BLADDER: ICD-10-CM

## 2022-11-16 LAB
ANION GAP SERPL CALC-SCNC: 13 MMOL/L — SIGNIFICANT CHANGE UP (ref 7–14)
BUN SERPL-MCNC: 17 MG/DL — SIGNIFICANT CHANGE UP (ref 7–23)
CALCIUM SERPL-MCNC: 9.4 MG/DL — SIGNIFICANT CHANGE UP (ref 8.4–10.5)
CHLORIDE SERPL-SCNC: 102 MMOL/L — SIGNIFICANT CHANGE UP (ref 98–107)
CO2 SERPL-SCNC: 25 MMOL/L — SIGNIFICANT CHANGE UP (ref 22–31)
CREAT SERPL-MCNC: 1.01 MG/DL — SIGNIFICANT CHANGE UP (ref 0.5–1.3)
EGFR: 82 ML/MIN/1.73M2 — SIGNIFICANT CHANGE UP
GLUCOSE SERPL-MCNC: 80 MG/DL — SIGNIFICANT CHANGE UP (ref 70–99)
HCT VFR BLD CALC: 45.2 % — SIGNIFICANT CHANGE UP (ref 39–50)
HGB BLD-MCNC: 15 G/DL — SIGNIFICANT CHANGE UP (ref 13–17)
MCHC RBC-ENTMCNC: 32.1 PG — SIGNIFICANT CHANGE UP (ref 27–34)
MCHC RBC-ENTMCNC: 33.2 GM/DL — SIGNIFICANT CHANGE UP (ref 32–36)
MCV RBC AUTO: 96.8 FL — SIGNIFICANT CHANGE UP (ref 80–100)
NRBC # BLD: 0 /100 WBCS — SIGNIFICANT CHANGE UP (ref 0–0)
NRBC # FLD: 0 K/UL — SIGNIFICANT CHANGE UP (ref 0–0)
PLATELET # BLD AUTO: 270 K/UL — SIGNIFICANT CHANGE UP (ref 150–400)
POTASSIUM SERPL-MCNC: 4.1 MMOL/L — SIGNIFICANT CHANGE UP (ref 3.5–5.3)
POTASSIUM SERPL-SCNC: 4.1 MMOL/L — SIGNIFICANT CHANGE UP (ref 3.5–5.3)
RBC # BLD: 4.67 M/UL — SIGNIFICANT CHANGE UP (ref 4.2–5.8)
RBC # FLD: 12.9 % — SIGNIFICANT CHANGE UP (ref 10.3–14.5)
SODIUM SERPL-SCNC: 140 MMOL/L — SIGNIFICANT CHANGE UP (ref 135–145)
WBC # BLD: 5.65 K/UL — SIGNIFICANT CHANGE UP (ref 3.8–10.5)
WBC # FLD AUTO: 5.65 K/UL — SIGNIFICANT CHANGE UP (ref 3.8–10.5)

## 2022-11-16 RX ORDER — CHOLECALCIFEROL (VITAMIN D3) 125 MCG
1 CAPSULE ORAL
Qty: 0 | Refills: 0 | DISCHARGE

## 2022-11-16 RX ORDER — ALPRAZOLAM 0.25 MG
1 TABLET ORAL
Qty: 0 | Refills: 0 | DISCHARGE

## 2022-11-16 RX ORDER — ASPIRIN/CALCIUM CARB/MAGNESIUM 324 MG
1 TABLET ORAL
Qty: 0 | Refills: 0 | DISCHARGE

## 2022-11-16 NOTE — H&P PST ADULT - HISTORY OF PRESENT ILLNESS
66y/o male presents for preop eval for scheduled cystostopy TURBT.  Pt with h/o hematuria.  Referred to urologist, further testing done.   Pt states "they found abnormal cells". Preop dx neoplasm of unspecified behavior of bladder.   68y/o male s/p  cystoscopy TURBT for bladder cancer in August 2022.  Pt states recent followup testing with Dr Lemos show bladder tumor.  Pt seen in PST for preop eval for scheduled cystoscopy TURBT, random bladder biopsy and instillation of chemotherapy. Preop dx neoplasm of unspecified behavior of bladder.

## 2022-11-16 NOTE — H&P PST ADULT - PROBLEM/PLAN-1
Asymptomatic pt with concern for COVID-19 exposure here for testing. Pt well-appearing, will swab and DC. Return precautions given.
DISPLAY PLAN FREE TEXT

## 2022-11-16 NOTE — H&P PST ADULT - NSICDXPASTSURGICALHX_GEN_ALL_CORE_FT
PAST SURGICAL HISTORY:  H/O hernia repair 2007     PAST SURGICAL HISTORY:  H/O hernia repair 2007    S/P cystoscopy TURBT 8/2022

## 2022-11-16 NOTE — H&P PST ADULT - PROBLEM SELECTOR PLAN 1
scheduled cystoscopy TURBT, random bladder biopsy and instillation of chemotherapy on 12/7/22.  Written & verbal preop instructions, gi prophylaxis  Pt verbalized good understanding. scheduled cystoscopy TURBT, random bladder biopsy and instillation of chemotherapy on 12/7/22.  Written & verbal preop instructions, gi prophylaxis  Pt verbalized good understanding.  MECHELLE precautions recommended.

## 2022-11-16 NOTE — H&P PST ADULT - NEGATIVE GENERAL GENITOURINARY SYMPTOMS
no renal colic/no flank pain L/no flank pain R/no bladder infections no hematuria/no renal colic/no flank pain L/no flank pain R/no bladder infections

## 2022-11-16 NOTE — H&P PST ADULT - NSICDXPASTMEDICALHX_GEN_ALL_CORE_FT
PAST MEDICAL HISTORY:  Anxiety     Neoplasm of unspecified behavior of bladder     Stroke h/o mini ?2016     PAST MEDICAL HISTORY:  Anxiety     Bladder cancer     Neoplasm of unspecified behavior of bladder     Stroke h/o mini ?2016

## 2022-11-17 LAB
CULTURE RESULTS: SIGNIFICANT CHANGE UP
SPECIMEN SOURCE: SIGNIFICANT CHANGE UP

## 2022-11-28 ENCOUNTER — APPOINTMENT (OUTPATIENT)
Dept: UROLOGY | Facility: CLINIC | Age: 67
End: 2022-11-28

## 2022-11-28 VITALS
DIASTOLIC BLOOD PRESSURE: 89 MMHG | RESPIRATION RATE: 16 BRPM | TEMPERATURE: 98.2 F | SYSTOLIC BLOOD PRESSURE: 151 MMHG | HEART RATE: 97 BPM

## 2022-11-28 PROCEDURE — 99214 OFFICE O/P EST MOD 30 MIN: CPT

## 2022-11-28 NOTE — HISTORY OF PRESENT ILLNESS
[FreeTextEntry1] : 6/06/2022:  is a 65 y/o M that presents today for evaluation of terminal gross hematuria. He reports that on 6/3 and 6/4 he experienced some degree of uncontrolled urination. At conclusion he had some minor pain in the penis and noticed some blood in his urine. Denies any flank or lower back pain. Did not see any stones come out. Has never had anything like this before. Is now fully recovered without intervention and feels well. Prior to his episode he was away on a business trip, drove 4 hours home, had a strong urge to urinate and was surprised at how uncontrolled it was. Often takes long drives so the drive was nothing new to him. Pt has seen  for almost 30 years, advised the patient to see me since there was blood in his urine. Prior to this episode of uncontrolled urination, he reports nocturia x1-2. Denies urinary urgency, pushing, straining, burning. The pt takes baby aspirin around 4-5x a week. Had a TIA a few years ago, couldn't see out of left eye for 2 minutes. Hx of panic attack due to work stress prior to TIA. Has not consulted with his neurologist if he should be taking baby aspirin every day or 4-5x. Patient has now cut back on his workload. Pt is not allergic to contrast. Recently had a colonoscopy. Pt also reports that he had about 2-3 episodes over a 9 month period dating back to late last summer of lower groin pain. These episodes lasted 1-2 hours and resolved on its own, appears to be gastrointestinal. Pt exercises often, does 35 sit ups every morning. \par \par 06/09/2022: Patient presents today for a follow up telephone visit for which he gave permission for. UA of 6/6/2022 revealed 6 RBC/HPF. Cytology of same date demonstrated atypical urothelial cells, singly and in groups, consistent with high grade urothelial carcinoma. Pt was instructed by my nurse to schedule CT Urogram and cystoscopy promptly. I called the patient on his house phone first and discussed things with his wife. Patient was reached on his cell phone. Pt denies seeing any more blood since he saw me. Urination is pretty good. \par \par 06/16/2022:  presents today for a cystoscopy to evaluate abnormal urine cytology of 6/6/2022 which demonstrated atypical urothelial cells, singly and in groups, consistent with high grade urothelial carcinoma. Patient obtained a CT abdomen and pelvis w/wo contrast on 6/14/2022 which demonstrated 2mm punctate nonobstructing stone in the right kidney. No other abnormality of the kidneys and urinary bladder. Enlarged prostate gland. \par \par Cystoscopy: The patient took one tablet of Valium 10 mg as prescribed prior to the procedure. Lidocaine jelly was inserted for numbing and lubrication. Bladder mucosa is a normal pale pink color. Right ureteral orifice is a slit but rounder when it opens, clear efflux. Normal position on the trigone. Left ureteral orifice is an oval shape, a bit wider with clear efflux seen. The bladder is 1+ trabeculated. Very tiny median lobe protruding a short distance into the bladder. Interureteric Ridge is a safe 2 cm from the prostate. Upon retroflexing the cystoscope, there is a tiny carpet of papillary tumor, about 5-6 foci that are merged together on left anterior wall, about midway form the bladder neck to the most superior part of the bladder. Little ulcerated area right next to it. Right side midway between the ridge and the prostate has a little tuft of tiny little balls on stalks about 1 cm across, biggest one is about 6 mm high off the bladder floor. Good distance from the right ureteral orifice. Little red spot 2 mm on interureteric ridge. Minimal hypertrophy with a few inflammatory polyps in the mid prostatic urethra. The patient took one Bactrim tablet at the time of the procedure and will take one before bed. The patient tolerated the procedure well and was in good spirits. \par \par Bladder cancer:  Plan TURBT and Gemcitabine chemotherapy, has LUTS needs Finasteride for his LUTS and prostate enlargmeent. 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 Feels well, Hematuria resolved. \par \par PLAN : Cysto n 2 weeks, re resect and rebiopsy, BCG therapy. \par \par \par 11/28/2022: Reason for Visit: Post-TurBT follow up\par \par Mr. 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 His wife Roberta was present via phone conference. Pt was educated on the anatomy of the bladder and why a resection is needed. Pt was informed if there's a hole in his bladder, he will have a catheter for a week. \par \par Assessment:  High  grade bladder cancer  biopsy specimen lacked muscularis propria \par \par Plan: re resection and rebiopsy. \par \par I have discussed at length the risks and benefits and rationale behind the procedure she seems to understand and wants to proceed.\par I counseled the patient. I discussed the various etiologies of his symptoms. Risks and alternatives were discussed. I answered the patient questions. The patient will follow-up as directed and will contact me with any questions or concerns. Thank you for the opportunity to participate in the care of this patient. I'll keep you updated on his progress.  \par

## 2022-12-06 PROBLEM — C67.9 MALIGNANT NEOPLASM OF BLADDER, UNSPECIFIED: Chronic | Status: ACTIVE | Noted: 2022-11-16

## 2022-12-07 ENCOUNTER — APPOINTMENT (OUTPATIENT)
Dept: UROLOGY | Facility: AMBULATORY SURGERY CENTER | Age: 67
End: 2022-12-07

## 2022-12-12 ENCOUNTER — TRANSCRIPTION ENCOUNTER (OUTPATIENT)
Age: 67
End: 2022-12-12

## 2022-12-12 NOTE — ASU PATIENT PROFILE, ADULT - NSICDXPASTMEDICALHX_GEN_ALL_CORE_FT
PAST MEDICAL HISTORY:  Anxiety     Bladder cancer     Neoplasm of unspecified behavior of bladder     Stroke h/o mini ?2016

## 2022-12-12 NOTE — ASU PATIENT PROFILE, ADULT - TEACHING/LEARNING LEARNING PREFERENCES
verbal instruction/written material individual instruction/skill demonstration/verbal instruction/written material

## 2022-12-13 ENCOUNTER — OUTPATIENT (OUTPATIENT)
Dept: OUTPATIENT SERVICES | Facility: HOSPITAL | Age: 67
LOS: 1 days | Discharge: HOME | End: 2022-12-13

## 2022-12-13 ENCOUNTER — APPOINTMENT (OUTPATIENT)
Dept: UROLOGY | Facility: HOSPITAL | Age: 67
End: 2022-12-13

## 2022-12-13 ENCOUNTER — TRANSCRIPTION ENCOUNTER (OUTPATIENT)
Age: 67
End: 2022-12-13

## 2022-12-13 VITALS
WEIGHT: 139.99 LBS | HEIGHT: 69 IN | HEART RATE: 73 BPM | RESPIRATION RATE: 15 BRPM | SYSTOLIC BLOOD PRESSURE: 141 MMHG | OXYGEN SATURATION: 100 % | TEMPERATURE: 98 F | DIASTOLIC BLOOD PRESSURE: 85 MMHG

## 2022-12-13 DIAGNOSIS — Z98.890 OTHER SPECIFIED POSTPROCEDURAL STATES: Chronic | ICD-10-CM

## 2022-12-13 DIAGNOSIS — D49.4 NEOPLASM OF UNSPECIFIED BEHAVIOR OF BLADDER: ICD-10-CM

## 2022-12-13 PROCEDURE — 51720 TREATMENT OF BLADDER LESION: CPT | Mod: 59

## 2022-12-13 PROCEDURE — 52234 CYSTOSCOPY AND TREATMENT: CPT

## 2022-12-13 RX ORDER — HYDROMORPHONE HYDROCHLORIDE 2 MG/ML
0.5 INJECTION INTRAMUSCULAR; INTRAVENOUS; SUBCUTANEOUS
Refills: 0 | Status: DISCONTINUED | OUTPATIENT
Start: 2022-12-13 | End: 2022-12-13

## 2022-12-13 RX ORDER — SODIUM CHLORIDE 9 MG/ML
1000 INJECTION, SOLUTION INTRAVENOUS
Refills: 0 | Status: DISCONTINUED | OUTPATIENT
Start: 2022-12-13 | End: 2022-12-28

## 2022-12-13 RX ORDER — ONDANSETRON 8 MG/1
4 TABLET, FILM COATED ORAL ONCE
Refills: 0 | Status: DISCONTINUED | OUTPATIENT
Start: 2022-12-13 | End: 2022-12-28

## 2022-12-13 RX ORDER — HYDROMORPHONE HYDROCHLORIDE 2 MG/ML
1 INJECTION INTRAMUSCULAR; INTRAVENOUS; SUBCUTANEOUS
Refills: 0 | Status: DISCONTINUED | OUTPATIENT
Start: 2022-12-13 | End: 2022-12-14

## 2022-12-13 RX ADMIN — HYDROMORPHONE HYDROCHLORIDE 1 MILLIGRAM(S): 2 INJECTION INTRAMUSCULAR; INTRAVENOUS; SUBCUTANEOUS at 23:20

## 2022-12-13 RX ADMIN — SODIUM CHLORIDE 75 MILLILITER(S): 9 INJECTION, SOLUTION INTRAVENOUS at 23:17

## 2022-12-13 RX ADMIN — HYDROMORPHONE HYDROCHLORIDE 1 MILLIGRAM(S): 2 INJECTION INTRAMUSCULAR; INTRAVENOUS; SUBCUTANEOUS at 23:35

## 2022-12-13 NOTE — ASU DISCHARGE PLAN (ADULT/PEDIATRIC) - ASU DC SPECIAL INSTRUCTIONSFT
GENERAL: It is common to have blood in your urine after your procedure. It may be pink or even red; inform your doctor if you have a significant amount of clot in the urine or if you are unable to void at all or if your catheter stops draining. The urine may clear and then become bloody again especially as you are more physically active. It is not uncommon to have some burning when you urinate, this will gradually improve.  BATHING: You may shower or bathe. If going home with corbett, shower only until catheter is removed.  DIET: You may resume your regular diet and regular medication regimen.  PAIN: You may take Tylenol (acetaminophen) 650-975mg and/or Motrin (ibuprofen) 400-600mg, both available over the counter, for pain every 6 hours as needed. Do not exceed 4000mg of Tylenol (acetaminophen) daily. You may alternate these medications such that you take one or the other every 3 hours for around the clock pain coverage.  STOOL SOFTENERS: Do not allow yourself to become constipated as straining may cause bleeding. Take stool softeners or a laxative (ex. Miralax, Colace, Senokot, ExLax, etc), available over the counter, if needed.  ACTIVITY: No heavy lifting or strenuous exercise until you are evaluated at your post-operative appointment. Otherwise, you may return to your usual level of physical activity.  FOLLOW-UP: Please follow up with Dr. Lemos.  CALL YOUR UROLOGIST IF: You have any bleeding that does not stop, inability to void >8 hours, fever over 100.4 F, chills, persistent nausea/vomiting, changes in your incision concerning for infection, or if your pain is not controlled on your discharge pain medications.

## 2022-12-13 NOTE — ASU DISCHARGE PLAN (ADULT/PEDIATRIC) - PROCEDURE
TURBT, bladder biopsies, instillation of gemcitabine TURBT 2 cm tumor, resection of 3 cm scar from piror resection and random bladder biopsies, instillation of gemcitabine

## 2022-12-13 NOTE — ASU DISCHARGE PLAN (ADULT/PEDIATRIC) - "IF YOU OR YOUR GUARDIAN/FAMILY IS A SMOKER, IT IS IMPORTANT FOR YOUR HEALTH TO STOP SMOKING. PLEASE BE AWARE THAT SECOND HAND SMOKE IS ALSO HARMFUL."
Statement Selected [Routine On-Treatment] : a routine on-treatment visit for [Breast Cancer] : breast cancer

## 2022-12-13 NOTE — BRIEF OPERATIVE NOTE - SPECIMENS
R scar resection, L scar resection, new tumor, bladder neck, bladder dome biopsy, L bladder wall, R bladder wall

## 2022-12-13 NOTE — ASU PREOP CHECKLIST - HEIGHT IN CM
Provider arched paged:    PAGER ID: [phone]   MESSAGE: [id number] GENARO Tomlinson c/o subjective confusion, can we order a CT of head to assess for mets? thanks, Zoila YBARRA [phone]     [phone]

## 2022-12-13 NOTE — ASU DISCHARGE PLAN (ADULT/PEDIATRIC) - CARE PROVIDER_API CALL
Migue Lemos)  Urology  [phone] [place] [address]  Phone: [phone]  Fax: [phone]  Established Patient  Follow Up Time:

## 2022-12-14 ENCOUNTER — RESULT REVIEW (OUTPATIENT)
Age: 67
End: 2022-12-14

## 2022-12-14 VITALS
SYSTOLIC BLOOD PRESSURE: 144 MMHG | DIASTOLIC BLOOD PRESSURE: 76 MMHG | HEART RATE: 95 BPM | RESPIRATION RATE: 17 BRPM | OXYGEN SATURATION: 96 %

## 2022-12-14 PROCEDURE — 88307 TISSUE EXAM BY PATHOLOGIST: CPT | Mod: 26

## 2022-12-14 PROCEDURE — 88304 TISSUE EXAM BY PATHOLOGIST: CPT | Mod: 26

## 2022-12-14 PROCEDURE — 88305 TISSUE EXAM BY PATHOLOGIST: CPT | Mod: 26

## 2022-12-14 RX ORDER — GEMCITABINE 38 MG/ML
2000 INJECTION, SOLUTION INTRAVENOUS ONCE
Refills: 0 | Status: DISCONTINUED | OUTPATIENT
Start: 2022-12-13 | End: 2022-12-28

## 2022-12-14 RX ADMIN — HYDROMORPHONE HYDROCHLORIDE 1 MILLIGRAM(S): 2 INJECTION INTRAMUSCULAR; INTRAVENOUS; SUBCUTANEOUS at 02:27

## 2022-12-14 RX ADMIN — HYDROMORPHONE HYDROCHLORIDE 1 MILLIGRAM(S): 2 INJECTION INTRAMUSCULAR; INTRAVENOUS; SUBCUTANEOUS at 00:57

## 2022-12-14 RX ADMIN — HYDROMORPHONE HYDROCHLORIDE 1 MILLIGRAM(S): 2 INJECTION INTRAMUSCULAR; INTRAVENOUS; SUBCUTANEOUS at 00:42

## 2022-12-14 NOTE — CHART NOTE - NSCHARTNOTEFT_GEN_A_CORE
Patient has been unable to void, bladder scan was 230 ml. He started to feel uncomfortable. An 18 FR Corbett catheter was placed draining 250 ml of dark pink urine. Patient will go home with corbett to leg bag.

## 2022-12-16 ENCOUNTER — APPOINTMENT (OUTPATIENT)
Dept: UROLOGY | Facility: CLINIC | Age: 67
End: 2022-12-16

## 2022-12-16 PROCEDURE — ZZZZZ: CPT

## 2022-12-16 PROCEDURE — 51700 IRRIGATION OF BLADDER: CPT

## 2022-12-16 PROCEDURE — A4216: CPT | Mod: NC

## 2022-12-20 ENCOUNTER — APPOINTMENT (OUTPATIENT)
Dept: UROLOGY | Facility: HOSPITAL | Age: 67
End: 2022-12-20

## 2022-12-21 ENCOUNTER — APPOINTMENT (OUTPATIENT)
Dept: UROLOGY | Facility: CLINIC | Age: 67
End: 2022-12-21

## 2022-12-21 LAB — SURGICAL PATHOLOGY STUDY: SIGNIFICANT CHANGE UP

## 2023-01-12 ENCOUNTER — APPOINTMENT (OUTPATIENT)
Dept: UROLOGY | Facility: CLINIC | Age: 68
End: 2023-01-12
Payer: COMMERCIAL

## 2023-01-12 ENCOUNTER — OUTPATIENT (OUTPATIENT)
Dept: OUTPATIENT SERVICES | Facility: HOSPITAL | Age: 68
LOS: 1 days | End: 2023-01-12
Payer: COMMERCIAL

## 2023-01-12 VITALS — HEART RATE: 75 BPM | SYSTOLIC BLOOD PRESSURE: 125 MMHG | TEMPERATURE: 98 F | DIASTOLIC BLOOD PRESSURE: 78 MMHG

## 2023-01-12 DIAGNOSIS — Z98.890 OTHER SPECIFIED POSTPROCEDURAL STATES: Chronic | ICD-10-CM

## 2023-01-12 DIAGNOSIS — R35.0 FREQUENCY OF MICTURITION: ICD-10-CM

## 2023-01-12 PROCEDURE — 51720 TREATMENT OF BLADDER LESION: CPT

## 2023-01-12 RX ORDER — BACILLUS CALMETTE-GUERIN 50 MG/50ML
50 POWDER, FOR SUSPENSION INTRAVESICAL
Qty: 0 | Refills: 0 | Status: COMPLETED | OUTPATIENT
Start: 2023-01-12

## 2023-01-12 RX ADMIN — BACILLUS CALMETTE-GUERIN 0 MG: 50 POWDER, FOR SUSPENSION INTRAVESICAL at 00:00

## 2023-01-17 ENCOUNTER — OUTPATIENT (OUTPATIENT)
Dept: OUTPATIENT SERVICES | Facility: HOSPITAL | Age: 68
LOS: 1 days | End: 2023-01-17
Payer: COMMERCIAL

## 2023-01-17 DIAGNOSIS — Z98.890 OTHER SPECIFIED POSTPROCEDURAL STATES: Chronic | ICD-10-CM

## 2023-01-17 PROCEDURE — 51720 TREATMENT OF BLADDER LESION: CPT

## 2023-01-19 ENCOUNTER — OUTPATIENT (OUTPATIENT)
Dept: OUTPATIENT SERVICES | Facility: HOSPITAL | Age: 68
LOS: 1 days | End: 2023-01-19
Payer: COMMERCIAL

## 2023-01-19 ENCOUNTER — APPOINTMENT (OUTPATIENT)
Dept: UROLOGY | Facility: CLINIC | Age: 68
End: 2023-01-19
Payer: COMMERCIAL

## 2023-01-19 VITALS — HEART RATE: 69 BPM | DIASTOLIC BLOOD PRESSURE: 77 MMHG | TEMPERATURE: 98 F | SYSTOLIC BLOOD PRESSURE: 133 MMHG

## 2023-01-19 DIAGNOSIS — C67.9 MALIGNANT NEOPLASM OF BLADDER, UNSPECIFIED: ICD-10-CM

## 2023-01-19 DIAGNOSIS — R35.0 FREQUENCY OF MICTURITION: ICD-10-CM

## 2023-01-19 DIAGNOSIS — Z98.890 OTHER SPECIFIED POSTPROCEDURAL STATES: Chronic | ICD-10-CM

## 2023-01-19 PROCEDURE — 51720 TREATMENT OF BLADDER LESION: CPT

## 2023-01-19 RX ORDER — BACILLUS CALMETTE-GUERIN 50 MG/50ML
50 POWDER, FOR SUSPENSION INTRAVESICAL
Qty: 0 | Refills: 0 | Status: COMPLETED | OUTPATIENT
Start: 2023-01-19

## 2023-01-19 RX ADMIN — BACILLUS CALMETTE-GUERIN 0 MG: 50 POWDER, FOR SUSPENSION INTRAVESICAL at 00:00

## 2023-01-20 DIAGNOSIS — C67.2 MALIGNANT NEOPLASM OF LATERAL WALL OF BLADDER: ICD-10-CM

## 2023-01-20 DIAGNOSIS — C67.9 MALIGNANT NEOPLASM OF BLADDER, UNSPECIFIED: ICD-10-CM

## 2023-01-26 ENCOUNTER — APPOINTMENT (OUTPATIENT)
Dept: UROLOGY | Facility: CLINIC | Age: 68
End: 2023-01-26
Payer: COMMERCIAL

## 2023-01-26 ENCOUNTER — OUTPATIENT (OUTPATIENT)
Dept: OUTPATIENT SERVICES | Facility: HOSPITAL | Age: 68
LOS: 1 days | End: 2023-01-26
Payer: COMMERCIAL

## 2023-01-26 VITALS — TEMPERATURE: 97.7 F

## 2023-01-26 DIAGNOSIS — Z98.890 OTHER SPECIFIED POSTPROCEDURAL STATES: Chronic | ICD-10-CM

## 2023-01-26 DIAGNOSIS — R35.0 FREQUENCY OF MICTURITION: ICD-10-CM

## 2023-01-26 PROCEDURE — 51720 TREATMENT OF BLADDER LESION: CPT

## 2023-01-26 RX ORDER — BACILLUS CALMETTE-GUERIN 50 MG/50ML
50 POWDER, FOR SUSPENSION INTRAVESICAL
Qty: 0 | Refills: 0 | Status: COMPLETED | OUTPATIENT
Start: 2023-01-26

## 2023-01-26 RX ADMIN — BACILLUS CALMETTE-GUERIN 0 MG: 50 POWDER, FOR SUSPENSION INTRAVESICAL at 00:00

## 2023-01-30 DIAGNOSIS — C67.9 MALIGNANT NEOPLASM OF BLADDER, UNSPECIFIED: ICD-10-CM

## 2023-02-02 ENCOUNTER — OUTPATIENT (OUTPATIENT)
Dept: OUTPATIENT SERVICES | Facility: HOSPITAL | Age: 68
LOS: 1 days | End: 2023-02-02
Payer: COMMERCIAL

## 2023-02-02 ENCOUNTER — APPOINTMENT (OUTPATIENT)
Dept: UROLOGY | Facility: CLINIC | Age: 68
End: 2023-02-02
Payer: COMMERCIAL

## 2023-02-02 VITALS
HEIGHT: 69 IN | SYSTOLIC BLOOD PRESSURE: 122 MMHG | WEIGHT: 145 LBS | DIASTOLIC BLOOD PRESSURE: 79 MMHG | HEART RATE: 71 BPM | BODY MASS INDEX: 21.48 KG/M2 | RESPIRATION RATE: 17 BRPM

## 2023-02-02 DIAGNOSIS — R35.0 FREQUENCY OF MICTURITION: ICD-10-CM

## 2023-02-02 PROCEDURE — 51720 TREATMENT OF BLADDER LESION: CPT

## 2023-02-02 RX ORDER — BACILLUS CALMETTE-GUERIN 50 MG/50ML
50 POWDER, FOR SUSPENSION INTRAVESICAL
Qty: 0 | Refills: 0 | Status: COMPLETED | OUTPATIENT
Start: 2023-02-02

## 2023-02-02 RX ADMIN — BACILLUS CALMETTE-GUERIN 0 MG: 50 POWDER, FOR SUSPENSION INTRAVESICAL at 00:00

## 2023-02-06 DIAGNOSIS — C67.9 MALIGNANT NEOPLASM OF BLADDER, UNSPECIFIED: ICD-10-CM

## 2023-02-08 ENCOUNTER — OUTPATIENT (OUTPATIENT)
Dept: OUTPATIENT SERVICES | Facility: HOSPITAL | Age: 68
LOS: 1 days | End: 2023-02-08
Payer: COMMERCIAL

## 2023-02-08 ENCOUNTER — APPOINTMENT (OUTPATIENT)
Dept: UROLOGY | Facility: CLINIC | Age: 68
End: 2023-02-08
Payer: COMMERCIAL

## 2023-02-08 VITALS — HEART RATE: 85 BPM | DIASTOLIC BLOOD PRESSURE: 81 MMHG | SYSTOLIC BLOOD PRESSURE: 121 MMHG | RESPIRATION RATE: 16 BRPM

## 2023-02-08 DIAGNOSIS — R35.0 FREQUENCY OF MICTURITION: ICD-10-CM

## 2023-02-08 DIAGNOSIS — C67.9 MALIGNANT NEOPLASM OF BLADDER, UNSPECIFIED: ICD-10-CM

## 2023-02-08 DIAGNOSIS — Z98.890 OTHER SPECIFIED POSTPROCEDURAL STATES: Chronic | ICD-10-CM

## 2023-02-08 PROCEDURE — 51720 TREATMENT OF BLADDER LESION: CPT

## 2023-02-08 RX ORDER — BACILLUS CALMETTE-GUERIN 50 MG/50ML
50 POWDER, FOR SUSPENSION INTRAVESICAL
Qty: 1 | Refills: 0 | Status: COMPLETED | OUTPATIENT
Start: 2023-02-08 | End: 2023-02-08

## 2023-02-08 RX ORDER — BACILLUS CALMETTE-GUERIN 50 MG/50ML
50 POWDER, FOR SUSPENSION INTRAVESICAL
Qty: 0 | Refills: 0 | Status: COMPLETED | OUTPATIENT
Start: 2023-02-08

## 2023-02-08 RX ADMIN — BACILLUS CALMETTE-GUERIN 0 MG: 50 POWDER, FOR SUSPENSION INTRAVESICAL at 00:00

## 2023-02-09 ENCOUNTER — APPOINTMENT (OUTPATIENT)
Dept: UROLOGY | Facility: CLINIC | Age: 68
End: 2023-02-09

## 2023-02-16 ENCOUNTER — OUTPATIENT (OUTPATIENT)
Dept: OUTPATIENT SERVICES | Facility: HOSPITAL | Age: 68
LOS: 1 days | End: 2023-02-16
Payer: COMMERCIAL

## 2023-02-16 ENCOUNTER — APPOINTMENT (OUTPATIENT)
Dept: UROLOGY | Facility: CLINIC | Age: 68
End: 2023-02-16
Payer: COMMERCIAL

## 2023-02-16 VITALS
SYSTOLIC BLOOD PRESSURE: 115 MMHG | HEART RATE: 73 BPM | TEMPERATURE: 97 F | DIASTOLIC BLOOD PRESSURE: 76 MMHG | OXYGEN SATURATION: 97 %

## 2023-02-16 DIAGNOSIS — R35.0 FREQUENCY OF MICTURITION: ICD-10-CM

## 2023-02-16 DIAGNOSIS — Z98.890 OTHER SPECIFIED POSTPROCEDURAL STATES: Chronic | ICD-10-CM

## 2023-02-16 PROCEDURE — 51720 TREATMENT OF BLADDER LESION: CPT

## 2023-02-16 RX ORDER — BACILLUS CALMETTE-GUERIN 50 MG/50ML
50 POWDER, FOR SUSPENSION INTRAVESICAL
Refills: 0 | Status: COMPLETED | OUTPATIENT
Start: 2023-02-16

## 2023-02-16 RX ADMIN — BACILLUS CALMETTE-GUERIN 0 MG: 50 POWDER, FOR SUSPENSION INTRAVESICAL at 00:00

## 2023-02-21 DIAGNOSIS — C67.9 MALIGNANT NEOPLASM OF BLADDER, UNSPECIFIED: ICD-10-CM

## 2023-03-20 ENCOUNTER — APPOINTMENT (OUTPATIENT)
Dept: UROLOGY | Facility: CLINIC | Age: 68
End: 2023-03-20
Payer: COMMERCIAL

## 2023-03-20 ENCOUNTER — OUTPATIENT (OUTPATIENT)
Dept: OUTPATIENT SERVICES | Facility: HOSPITAL | Age: 68
LOS: 1 days | End: 2023-03-20
Payer: COMMERCIAL

## 2023-03-20 VITALS
WEIGHT: 145 LBS | BODY MASS INDEX: 21.48 KG/M2 | HEIGHT: 69 IN | DIASTOLIC BLOOD PRESSURE: 77 MMHG | RESPIRATION RATE: 17 BRPM | HEART RATE: 83 BPM | SYSTOLIC BLOOD PRESSURE: 146 MMHG | OXYGEN SATURATION: 98 %

## 2023-03-20 DIAGNOSIS — Z98.890 OTHER SPECIFIED POSTPROCEDURAL STATES: Chronic | ICD-10-CM

## 2023-03-20 DIAGNOSIS — R35.0 FREQUENCY OF MICTURITION: ICD-10-CM

## 2023-03-20 PROCEDURE — 88112 CYTOPATH CELL ENHANCE TECH: CPT | Mod: 26

## 2023-03-20 PROCEDURE — 52000 CYSTOURETHROSCOPY: CPT

## 2023-03-21 NOTE — HISTORY OF PRESENT ILLNESS
[FreeTextEntry1] : 6/06/2022:  is a 67 y/o M that presents today for evaluation of terminal gross hematuria. He reports that on 6/3 and 6/4 he experienced some degree of uncontrolled urination. At conclusion he had some minor pain in the penis and noticed some blood in his urine. Denies any flank or lower back pain. Did not see any stones come out. Has never had anything like this before. Is now fully recovered without intervention and feels well. Prior to his episode he was away on a business trip, drove 4 hours home, had a strong urge to urinate and was surprised at how uncontrolled it was. Often takes long drives so the drive was nothing new to him. Pt has seen  for almost 30 years, advised the patient to see me since there was blood in his urine. Prior to this episode of uncontrolled urination, he reports nocturia x1-2. Denies urinary urgency, pushing, straining, burning. The pt takes baby aspirin around 4-5x a week. Had a TIA a few years ago, couldn't see out of left eye for 2 minutes. Hx of panic attack due to work stress prior to TIA. Has not consulted with his neurologist if he should be taking baby aspirin every day or 4-5x. Patient has now cut back on his workload. Pt is not allergic to contrast. Recently had a colonoscopy. Pt also reports that he had about 2-3 episodes over a 9 month period dating back to late last summer of lower groin pain. These episodes lasted 1-2 hours and resolved on its own, appears to be gastrointestinal. Pt exercises often, does 35 sit ups every morning. \par \par 06/09/2022: Patient presents today for a follow up telephone visit for which he gave permission for. UA of 6/6/2022 revealed 6 RBC/HPF. Cytology of same date demonstrated atypical urothelial cells, singly and in groups, consistent with high grade urothelial carcinoma. Pt was instructed by my nurse to schedule CT Urogram and cystoscopy promptly. I called the patient on his house phone first and discussed things with his wife. Patient was reached on his cell phone. Pt denies seeing any more blood since he saw me. Urination is pretty good. \par \par 06/16/2022:  presents today for a cystoscopy to evaluate abnormal urine cytology of 6/6/2022 which demonstrated atypical urothelial cells, singly and in groups, consistent with high grade urothelial carcinoma. Patient obtained a CT abdomen and pelvis w/wo contrast on 6/14/2022 which demonstrated 2mm punctate nonobstructing stone in the right kidney. No other abnormality of the kidneys and urinary bladder. Enlarged prostate gland. \par \par Cystoscopy: The patient took one tablet of Valium 10 mg as prescribed prior to the procedure. Lidocaine jelly was inserted for numbing and lubrication. Bladder mucosa is a normal pale pink color. Right ureteral orifice is a slit but rounder when it opens, clear efflux. Normal position on the trigone. Left ureteral orifice is an oval shape, a bit wider with clear efflux seen. The bladder is 1+ trabeculated. Very tiny median lobe protruding a short distance into the bladder. Interureteric Ridge is a safe 2 cm from the prostate. Upon retroflexing the cystoscope, there is a tiny carpet of papillary tumor, about 5-6 foci that are merged together on left anterior wall, about midway form the bladder neck to the most superior part of the bladder. Little ulcerated area right next to it. Right side midway between the ridge and the prostate has a little tuft of tiny little balls on stalks about 1 cm across, biggest one is about 6 mm high off the bladder floor. Good distance from the right ureteral orifice. Little red spot 2 mm on interureteric ridge. Minimal hypertrophy with a few inflammatory polyps in the mid prostatic urethra. The patient took one Bactrim tablet at the time of the procedure and will take one before bed. The patient tolerated the procedure well and was in good spirits. \par \par Bladder cancer:  Plan TURBT and Gemcitabine chemotherapy, has LUTS needs Finasteride for his LUTS and prostate enlargmeent. 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 Feels well, Hematuria resolved. \par PLAN : Cysto n 2 weeks, re resect and rebiopsy, BCG therapy. \par \par 11/28/2022: Mr. 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 His wife Roberta was present via phone conference. Pt was educated on the anatomy of the bladder and why a resection is needed. Pt was informed if there's a hole in his bladder, he will have a catheter for a week. \par Assessment:  High  grade bladder cancer  biopsy specimen lacked muscularis propria \par Plan: re resection and rebiopsy. \par \par 03/20/2023: Mr. ARANDA is a 67 year old male presenting today for a cystoscopy  s/p BCG therapy for bladder cancer. He is accompanied today by his wife. After the cystoscopy I explain to the patient that no bladder tumor was found following treatment with BCG. He complains of sporadic discomfort from his left sided inguinal hernia. \par \par He is here for post BCG cystoscopy.  He had aggressive lamina propria invasion urothelial carcinoma multiple sites of urothelial carcinoma.  He underwent reresection of the most aggressive areas without any evidence of recurrence or residual tumor.  This allowed him to get 6 weekly courses of BCG therapy.  He presents now 5 weeks after his last BCG course for cystoscopy.\par \par Cystoscopy after informed consent was obtained he was prepped and draped in usual sterile fashion 10 cc of lidocaine jelly instilled through the urethra and a flexible cystoscope advanced through the urethra into the bladder patient has a normal urethra he has trilobar hyper hypertrophy with obstructing lateral lobes patient has bladder which shows several areas of scar from his prior resection no evidence of urothelial cancer clear reflux from the left side clear reflux from the right side.  Patient provided a urine sample for cytology before cystoscopy was performed.\par \par Assessment: T1NxMx urothelial carcinoma with m muscularis propria invasion.  Reresection showed no evidence of residual cancer\par \par Plan: Pt provides urine specimen for Urine Cytology today. If negative, pt will have a cystoscopy in 3 months. If Cytology is positive, pt will have a CT Urogram Abdomen. He will continue finasteride 5 mg One Oral tablet daily. Pt will be referred to general surgeon to discuss possible hernia repair. Follow up in 3 months. \par

## 2023-03-21 NOTE — ADDENDUM
[FreeTextEntry1] : This note was authored by Pavel Nino working as a scribe for Dr. Migue Lemos. I, Dr. Migue Lemos have reviewed the content of this note and confirm it is true and accurate. I personally performed the history and physical examination and made all the decisions. 03/20/2023

## 2023-03-22 DIAGNOSIS — C67.9 MALIGNANT NEOPLASM OF BLADDER, UNSPECIFIED: ICD-10-CM

## 2023-03-28 LAB — URINE CYTOLOGY: NORMAL

## 2023-06-19 ENCOUNTER — APPOINTMENT (OUTPATIENT)
Dept: UROLOGY | Facility: CLINIC | Age: 68
End: 2023-06-19
Payer: COMMERCIAL

## 2023-06-19 ENCOUNTER — APPOINTMENT (OUTPATIENT)
Dept: UROLOGY | Facility: CLINIC | Age: 68
End: 2023-06-19

## 2023-06-19 ENCOUNTER — OUTPATIENT (OUTPATIENT)
Dept: OUTPATIENT SERVICES | Facility: HOSPITAL | Age: 68
LOS: 1 days | End: 2023-06-19
Payer: COMMERCIAL

## 2023-06-19 DIAGNOSIS — Z98.890 OTHER SPECIFIED POSTPROCEDURAL STATES: Chronic | ICD-10-CM

## 2023-06-19 DIAGNOSIS — R35.0 FREQUENCY OF MICTURITION: ICD-10-CM

## 2023-06-19 PROCEDURE — 52000 CYSTOURETHROSCOPY: CPT

## 2023-06-19 PROCEDURE — 88112 CYTOPATH CELL ENHANCE TECH: CPT | Mod: 26

## 2023-06-19 NOTE — HISTORY OF PRESENT ILLNESS
[FreeTextEntry1] : Reason For Visit\par LYNSEY ARANDA is here today for a follow-up visit for Bladder Tumor. \par Patient accompanied by spouse (wife). \par  \par History of Present Illness\par 6/06/2022:  is a 65 y/o M that presents today for evaluation of terminal gross hematuria. He reports that on 6/3 and 6/4 he experienced some degree of uncontrolled urination. At conclusion he had some minor pain in the penis and noticed some blood in his urine. Denies any flank or lower back pain. Did not see any stones come out. Has never had anything like this before. Is now fully recovered without intervention and feels well. Prior to his episode he was away on a business trip, drove 4 hours home, had a strong urge to urinate and was surprised at how uncontrolled it was. Often takes long drives so the drive was nothing new to him. Pt has seen  for almost 30 years, advised the patient to see me since there was blood in his urine. Prior to this episode of uncontrolled urination, he reports nocturia x1-2. Denies urinary urgency, pushing, straining, burning. The pt takes baby aspirin around 4-5x a week. Had a TIA a few years ago, couldn't see out of left eye for 2 minutes. Hx of panic attack due to work stress prior to TIA. Has not consulted with his neurologist if he should be taking baby aspirin every day or 4-5x. Patient has now cut back on his workload. Pt is not allergic to contrast. Recently had a colonoscopy. Pt also reports that he had about 2-3 episodes over a 9 month period dating back to late last summer of lower groin pain. These episodes lasted 1-2 hours and resolved on its own, appears to be gastrointestinal. Pt exercises often, does 35 sit ups every morning. \par \par 06/09/2022: Patient presents today for a follow up telephone visit for which he gave permission for. UA of 6/6/2022 revealed 6 RBC/HPF. Cytology of same date demonstrated atypical urothelial cells, singly and in groups, consistent with high grade urothelial carcinoma. Pt was instructed by my nurse to schedule CT Urogram and cystoscopy promptly. I called the patient on his house phone first and discussed things with his wife. Patient was reached on his cell phone. Pt denies seeing any more blood since he saw me. Urination is pretty good. \par \par 06/16/2022:  presents today for a cystoscopy to evaluate abnormal urine cytology of 6/6/2022 which demonstrated atypical urothelial cells, singly and in groups, consistent with high grade urothelial carcinoma. Patient obtained a CT abdomen and pelvis w/wo contrast on 6/14/2022 which demonstrated 2mm punctate nonobstructing stone in the right kidney. No other abnormality of the kidneys and urinary bladder. Enlarged prostate gland. \par \par Cystoscopy: The patient took one tablet of Valium 10 mg as prescribed prior to the procedure. Lidocaine jelly was inserted for numbing and lubrication. Bladder mucosa is a normal pale pink color. Right ureteral orifice is a slit but rounder when it opens, clear efflux. Normal position on the trigone. Left ureteral orifice is an oval shape, a bit wider with clear efflux seen. The bladder is 1+ trabeculated. Very tiny median lobe protruding a short distance into the bladder. Interureteric Ridge is a safe 2 cm from the prostate. Upon retroflexing the cystoscope, there is a tiny carpet of papillary tumor, about 5-6 foci that are merged together on left anterior wall, about midway form the bladder neck to the most superior part of the bladder. Little ulcerated area right next to it. Right side midway between the ridge and the prostate has a little tuft of tiny little balls on stalks about 1 cm across, biggest one is about 6 mm high off the bladder floor. Good distance from the right ureteral orifice. Little red spot 2 mm on interureteric ridge. Minimal hypertrophy with a few inflammatory polyps in the mid prostatic urethra. The patient took one Bactrim tablet at the time of the procedure and will take one before bed. The patient tolerated the procedure well and was in good spirits. \par \par Bladder cancer: Plan TURBT and Gemcitabine chemotherapy, has LUTS needs Finasteride for his LUTS and prostate enlargmeent. 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 Feels well, Hematuria resolved. \par PLAN : Cysto n 2 weeks, re resect and rebiopsy, BCG therapy. \par \par 11/28/2022: Mr. 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 His wife Roberta was present via phone conference. Pt was educated on the anatomy of the bladder and why a resection is needed. Pt was informed if there's a hole in his bladder, he will have a catheter for a week. \par Assessment: High grade bladder cancer biopsy specimen lacked muscularis propria \par Plan: re resection and rebiopsy. \par \par 03/20/2023: Mr. ARANDA is a 67 year old male presenting today for a cystoscopy s/p BCG therapy for bladder cancer. He is accompanied today by his wife. After the cystoscopy I explain to the patient that no bladder tumor was found following treatment with BCG. He complains of sporadic discomfort from his left sided inguinal hernia. \par \par He is here for post BCG cystoscopy. He had aggressive lamina propria invasion urothelial carcinoma multiple sites of urothelial carcinoma. He underwent reresection of the most aggressive areas without any evidence of recurrence or residual tumor. This allowed him to get 6 weekly courses of BCG therapy. He presents now 5 weeks after his last BCG course for cystoscopy.\par \par Cystoscopy after informed consent was obtained he was prepped and draped in usual sterile fashion 10 cc of lidocaine jelly instilled through the urethra and a flexible cystoscope advanced through the urethra into the bladder patient has a normal urethra he has trilobar hyper hypertrophy with obstructing lateral lobes patient has bladder which shows several areas of scar from his prior resection no evidence of urothelial cancer clear reflux from the left side clear reflux from the right side. Patient provided a urine sample for cytology before cystoscopy was performed.\par \par Assessment: T1NxMx urothelial carcinoma with m muscularis propria invasion. Reresection showed no evidence of residual cancer\par \par Plan: Pt provides urine specimen for Urine Cytology today. If negative, pt will have a cystoscopy in 3 months. If Cytology is positive, pt will have a CT Urogram Abdomen. He will continue finasteride 5 mg One Oral tablet daily. Pt will be referred to general surgeon to discuss possible hernia repair. Follow up in 3 months. \par \par  \par \par 6/19/23\par Patient with high-grade lamina propria invasive Mohs still invasive urothelial carcinoma the bladder had his initial  resection was in August 2022 showing high-grade invasive carcinoma into the lamina propria he underwent repeat resection in December 2022 showing no evidence of recurrence or residual tumor in the area of the invasive carcinoma into the left lamina propria however he had several areas of high-grade noninvasive urothelial carcinoma.  As a result of the reresection in December 2022 he underwent 6 weekly courses of BCG and now 3 months later he presents for his follow-up cystoscopy.\par \par Impression cystoscopy: After informed consent was obtained he was prepped and draped in usual sterile fashion a flexible cystoscope advanced through the urethra into the bladder patient's urethra is normal his prostate has mild obstruction no evidence of urothelial cancer in the prostatic fossa then patient has completely normal bladder mucosa clear reflux from the left and the right side no evidence of recurrence.\par \par Impression high-grade superficial invasive urothelial carcinoma the bladder status post BCG treatment\par \par Plan cystoscopy and and cytology in 3 months cytology will be sent for today.

## 2023-06-20 DIAGNOSIS — C67.9 MALIGNANT NEOPLASM OF BLADDER, UNSPECIFIED: ICD-10-CM

## 2023-06-26 LAB — URINE CYTOLOGY: NORMAL

## 2023-08-28 ENCOUNTER — NON-APPOINTMENT (OUTPATIENT)
Age: 68
End: 2023-08-28

## 2023-09-13 ENCOUNTER — APPOINTMENT (OUTPATIENT)
Dept: UROLOGY | Facility: CLINIC | Age: 68
End: 2023-09-13

## 2023-09-13 ENCOUNTER — APPOINTMENT (OUTPATIENT)
Dept: UROLOGY | Facility: CLINIC | Age: 68
End: 2023-09-13
Payer: COMMERCIAL

## 2023-09-13 VITALS
SYSTOLIC BLOOD PRESSURE: 137 MMHG | DIASTOLIC BLOOD PRESSURE: 86 MMHG | OXYGEN SATURATION: 98 % | HEIGHT: 69 IN | HEART RATE: 67 BPM | RESPIRATION RATE: 17 BRPM | WEIGHT: 145 LBS | BODY MASS INDEX: 21.48 KG/M2 | TEMPERATURE: 97.5 F

## 2023-09-13 PROCEDURE — 52000 CYSTOURETHROSCOPY: CPT

## 2023-09-18 ENCOUNTER — APPOINTMENT (OUTPATIENT)
Dept: SURGERY | Facility: CLINIC | Age: 68
End: 2023-09-18
Payer: COMMERCIAL

## 2023-09-18 VITALS
DIASTOLIC BLOOD PRESSURE: 82 MMHG | WEIGHT: 145 LBS | HEIGHT: 69 IN | HEART RATE: 74 BPM | SYSTOLIC BLOOD PRESSURE: 131 MMHG | OXYGEN SATURATION: 96 % | TEMPERATURE: 98.2 F | BODY MASS INDEX: 21.48 KG/M2

## 2023-09-18 DIAGNOSIS — N13.8 BENIGN PROSTATIC HYPERPLASIA WITH LOWER URINARY TRACT SYMPMS: ICD-10-CM

## 2023-09-18 DIAGNOSIS — N40.1 BENIGN PROSTATIC HYPERPLASIA WITH LOWER URINARY TRACT SYMPMS: ICD-10-CM

## 2023-09-18 LAB — URINE CYTOLOGY: NORMAL

## 2023-09-18 PROCEDURE — 99203 OFFICE O/P NEW LOW 30 MIN: CPT

## 2023-09-18 RX ORDER — FINASTERIDE 5 MG/1
5 TABLET, FILM COATED ORAL DAILY
Qty: 90 | Refills: 3 | Status: ACTIVE | COMMUNITY
Start: 2022-06-22 | End: 1900-01-01

## 2023-10-30 ENCOUNTER — NON-APPOINTMENT (OUTPATIENT)
Age: 68
End: 2023-10-30

## 2023-11-03 ENCOUNTER — OUTPATIENT (OUTPATIENT)
Dept: OUTPATIENT SERVICES | Facility: HOSPITAL | Age: 68
LOS: 1 days | Discharge: HOME | End: 2023-11-03
Payer: COMMERCIAL

## 2023-11-03 VITALS
RESPIRATION RATE: 16 BRPM | WEIGHT: 145.51 LBS | TEMPERATURE: 98 F | DIASTOLIC BLOOD PRESSURE: 80 MMHG | SYSTOLIC BLOOD PRESSURE: 129 MMHG | HEIGHT: 69 IN | HEART RATE: 70 BPM | OXYGEN SATURATION: 97 %

## 2023-11-03 DIAGNOSIS — Z01.818 ENCOUNTER FOR OTHER PREPROCEDURAL EXAMINATION: ICD-10-CM

## 2023-11-03 DIAGNOSIS — Z98.890 OTHER SPECIFIED POSTPROCEDURAL STATES: Chronic | ICD-10-CM

## 2023-11-03 DIAGNOSIS — K40.90 UNILATERAL INGUINAL HERNIA, WITHOUT OBSTRUCTION OR GANGRENE, NOT SPECIFIED AS RECURRENT: ICD-10-CM

## 2023-11-03 LAB
ANION GAP SERPL CALC-SCNC: 4 MMOL/L — LOW (ref 5–17)
ANION GAP SERPL CALC-SCNC: 4 MMOL/L — LOW (ref 5–17)
BUN SERPL-MCNC: 16 MG/DL — SIGNIFICANT CHANGE UP (ref 7–23)
BUN SERPL-MCNC: 16 MG/DL — SIGNIFICANT CHANGE UP (ref 7–23)
CALCIUM SERPL-MCNC: 8.5 MG/DL — SIGNIFICANT CHANGE UP (ref 8.5–10.1)
CALCIUM SERPL-MCNC: 8.5 MG/DL — SIGNIFICANT CHANGE UP (ref 8.5–10.1)
CHLORIDE SERPL-SCNC: 108 MMOL/L — SIGNIFICANT CHANGE UP (ref 96–108)
CHLORIDE SERPL-SCNC: 108 MMOL/L — SIGNIFICANT CHANGE UP (ref 96–108)
CO2 SERPL-SCNC: 26 MMOL/L — SIGNIFICANT CHANGE UP (ref 22–31)
CO2 SERPL-SCNC: 26 MMOL/L — SIGNIFICANT CHANGE UP (ref 22–31)
CREAT SERPL-MCNC: 1.05 MG/DL — SIGNIFICANT CHANGE UP (ref 0.5–1.3)
CREAT SERPL-MCNC: 1.05 MG/DL — SIGNIFICANT CHANGE UP (ref 0.5–1.3)
EGFR: 77 ML/MIN/1.73M2 — SIGNIFICANT CHANGE UP
EGFR: 77 ML/MIN/1.73M2 — SIGNIFICANT CHANGE UP
GLUCOSE SERPL-MCNC: 102 MG/DL — HIGH (ref 70–99)
GLUCOSE SERPL-MCNC: 102 MG/DL — HIGH (ref 70–99)
HCT VFR BLD CALC: 44 % — SIGNIFICANT CHANGE UP (ref 39–50)
HCT VFR BLD CALC: 44 % — SIGNIFICANT CHANGE UP (ref 39–50)
HGB BLD-MCNC: 15.2 G/DL — SIGNIFICANT CHANGE UP (ref 13–17)
HGB BLD-MCNC: 15.2 G/DL — SIGNIFICANT CHANGE UP (ref 13–17)
MCHC RBC-ENTMCNC: 32.5 PG — SIGNIFICANT CHANGE UP (ref 27–34)
MCHC RBC-ENTMCNC: 32.5 PG — SIGNIFICANT CHANGE UP (ref 27–34)
MCHC RBC-ENTMCNC: 34.5 G/DL — SIGNIFICANT CHANGE UP (ref 32–36)
MCHC RBC-ENTMCNC: 34.5 G/DL — SIGNIFICANT CHANGE UP (ref 32–36)
MCV RBC AUTO: 94.2 FL — SIGNIFICANT CHANGE UP (ref 80–100)
MCV RBC AUTO: 94.2 FL — SIGNIFICANT CHANGE UP (ref 80–100)
NRBC # BLD: 0 /100 WBCS — SIGNIFICANT CHANGE UP (ref 0–0)
NRBC # BLD: 0 /100 WBCS — SIGNIFICANT CHANGE UP (ref 0–0)
PLATELET # BLD AUTO: 259 K/UL — SIGNIFICANT CHANGE UP (ref 150–400)
PLATELET # BLD AUTO: 259 K/UL — SIGNIFICANT CHANGE UP (ref 150–400)
POTASSIUM SERPL-MCNC: 4 MMOL/L — SIGNIFICANT CHANGE UP (ref 3.5–5.3)
POTASSIUM SERPL-MCNC: 4 MMOL/L — SIGNIFICANT CHANGE UP (ref 3.5–5.3)
POTASSIUM SERPL-SCNC: 4 MMOL/L — SIGNIFICANT CHANGE UP (ref 3.5–5.3)
POTASSIUM SERPL-SCNC: 4 MMOL/L — SIGNIFICANT CHANGE UP (ref 3.5–5.3)
RBC # BLD: 4.67 M/UL — SIGNIFICANT CHANGE UP (ref 4.2–5.8)
RBC # BLD: 4.67 M/UL — SIGNIFICANT CHANGE UP (ref 4.2–5.8)
RBC # FLD: 12.8 % — SIGNIFICANT CHANGE UP (ref 10.3–14.5)
RBC # FLD: 12.8 % — SIGNIFICANT CHANGE UP (ref 10.3–14.5)
SODIUM SERPL-SCNC: 138 MMOL/L — SIGNIFICANT CHANGE UP (ref 135–145)
SODIUM SERPL-SCNC: 138 MMOL/L — SIGNIFICANT CHANGE UP (ref 135–145)
WBC # BLD: 5.94 K/UL — SIGNIFICANT CHANGE UP (ref 3.8–10.5)
WBC # BLD: 5.94 K/UL — SIGNIFICANT CHANGE UP (ref 3.8–10.5)
WBC # FLD AUTO: 5.94 K/UL — SIGNIFICANT CHANGE UP (ref 3.8–10.5)
WBC # FLD AUTO: 5.94 K/UL — SIGNIFICANT CHANGE UP (ref 3.8–10.5)

## 2023-11-03 PROCEDURE — 93010 ELECTROCARDIOGRAM REPORT: CPT

## 2023-11-03 RX ORDER — IBUPROFEN 200 MG
2 TABLET ORAL
Qty: 0 | Refills: 0 | DISCHARGE

## 2023-11-03 RX ORDER — ALUMINUM ZIRCONIUM TRICHLOROHYDREX GLY 0.2 G/G
1 STICK TOPICAL
Qty: 0 | Refills: 0 | DISCHARGE

## 2023-11-03 RX ORDER — DOCUSATE SODIUM 100 MG
0 CAPSULE ORAL
Qty: 0 | Refills: 0 | DISCHARGE

## 2023-11-03 RX ORDER — SODIUM CHLORIDE 9 MG/ML
3 INJECTION INTRAMUSCULAR; INTRAVENOUS; SUBCUTANEOUS EVERY 8 HOURS
Refills: 0 | Status: DISCONTINUED | OUTPATIENT
Start: 2023-11-17 | End: 2023-11-17

## 2023-11-03 NOTE — H&P PST ADULT - HISTORY OF PRESENT ILLNESS
67 y/o male, male with c/o of bulging in the  groin for . Seen by Dr Lantigua, diagnosed with  inguinal hernia.  Scheduled for Laparoscopic inguinal hernia repair on  Pre op testing today.   69 y/o male, male with c/o of bulging in the  left groin for almost a year. Seen by Dr Haro couple of months ago, , diagnosed with left   inguinal hernia.  Scheduled for robotic assisted  Laparoscopic left inguinal hernia repair on 11/17/23.  Pre op testing today.

## 2023-11-03 NOTE — H&P PST ADULT - PROBLEM SELECTOR PLAN 1
Robotic assisted laparoscopic left inguinal hernia repair on 11/17/23,  Pre op instructions:   Hold OTC supplements.   Medical eval needed  May take Tylenol for pain or headache if needed.    NPO after 11pm to the morning of surgery.   Antibacterial wash instructions given for the morning of surgery  Patient verbalized understanding.

## 2023-11-03 NOTE — H&P PST ADULT - GENITOURINARY MALE
normal external genitalia/no hernia/no discharge/no mass/no tenderness/no ulcer/normal/no penile lesion/no palpable testicular mass/no scrotal mass hernia L

## 2023-11-03 NOTE — H&P PST ADULT - ATTENDING COMMENTS
Patient seen and examined  Risks, benefits, and alternatives to laparoscopic, robotic assisted, left inguinal hernia repair with mesh, possible right.  Patient marked. Consent signed.

## 2023-11-06 ENCOUNTER — LABORATORY RESULT (OUTPATIENT)
Age: 68
End: 2023-11-06

## 2023-11-06 ENCOUNTER — APPOINTMENT (OUTPATIENT)
Dept: INTERNAL MEDICINE | Facility: CLINIC | Age: 68
End: 2023-11-06
Payer: COMMERCIAL

## 2023-11-06 VITALS — BODY MASS INDEX: 20.73 KG/M2 | HEIGHT: 69 IN | WEIGHT: 140 LBS

## 2023-11-06 VITALS — DIASTOLIC BLOOD PRESSURE: 60 MMHG | SYSTOLIC BLOOD PRESSURE: 130 MMHG

## 2023-11-06 DIAGNOSIS — K40.90 UNILATERAL INGUINAL HERNIA, W/OUT OBSTRUCTION OR GANGRENE, NOT SPECIFIED AS RECURRENT: ICD-10-CM

## 2023-11-06 DIAGNOSIS — F41.9 ANXIETY DISORDER, UNSPECIFIED: ICD-10-CM

## 2023-11-06 DIAGNOSIS — Z00.00 ENCOUNTER FOR GENERAL ADULT MEDICAL EXAMINATION W/OUT ABNORMAL FINDINGS: ICD-10-CM

## 2023-11-06 DIAGNOSIS — F41.0 PANIC DISORDER [EPISODIC PAROXYSMAL ANXIETY]: ICD-10-CM

## 2023-11-06 PROCEDURE — 99397 PER PM REEVAL EST PAT 65+ YR: CPT | Mod: 25

## 2023-11-06 PROCEDURE — 36415 COLL VENOUS BLD VENIPUNCTURE: CPT

## 2023-11-07 LAB
APPEARANCE: CLEAR
BACTERIA: NEGATIVE /HPF
BILIRUBIN URINE: NEGATIVE
BLOOD URINE: ABNORMAL
CAST: 0 /LPF
CHOLEST SERPL-MCNC: 238 MG/DL
COLOR: YELLOW
EPITHELIAL CELLS: 0 /HPF
ESTIMATED AVERAGE GLUCOSE: 114 MG/DL
GLUCOSE QUALITATIVE U: NEGATIVE MG/DL
HBA1C MFR BLD HPLC: 5.6 %
HDLC SERPL-MCNC: 101 MG/DL
KETONES URINE: NEGATIVE MG/DL
LDLC SERPL CALC-MCNC: 116 MG/DL
LEUKOCYTE ESTERASE URINE: NEGATIVE
MICROSCOPIC-UA: NORMAL
NITRITE URINE: NEGATIVE
NONHDLC SERPL-MCNC: 137 MG/DL
PH URINE: 5.5
PROTEIN URINE: NEGATIVE MG/DL
PSA SERPL-MCNC: 2.23 NG/ML
RED BLOOD CELLS URINE: 0 /HPF
SPECIFIC GRAVITY URINE: 1.02
T3RU NFR SERPL: 1 TBI
T4 SERPL-MCNC: 4.7 UG/DL
TRIGL SERPL-MCNC: 129 MG/DL
TSH SERPL-ACNC: 2.04 UIU/ML
UROBILINOGEN URINE: 0.2 MG/DL
WHITE BLOOD CELLS URINE: 0 /HPF

## 2023-11-16 ENCOUNTER — TRANSCRIPTION ENCOUNTER (OUTPATIENT)
Age: 68
End: 2023-11-16

## 2023-11-17 ENCOUNTER — APPOINTMENT (OUTPATIENT)
Dept: SURGERY | Facility: HOSPITAL | Age: 68
End: 2023-11-17

## 2023-11-17 ENCOUNTER — TRANSCRIPTION ENCOUNTER (OUTPATIENT)
Age: 68
End: 2023-11-17

## 2023-11-17 ENCOUNTER — OUTPATIENT (OUTPATIENT)
Dept: OUTPATIENT SERVICES | Facility: HOSPITAL | Age: 68
LOS: 1 days | Discharge: HOME | End: 2023-11-17
Payer: COMMERCIAL

## 2023-11-17 VITALS
DIASTOLIC BLOOD PRESSURE: 75 MMHG | SYSTOLIC BLOOD PRESSURE: 145 MMHG | OXYGEN SATURATION: 99 % | HEART RATE: 70 BPM | RESPIRATION RATE: 16 BRPM

## 2023-11-17 VITALS
DIASTOLIC BLOOD PRESSURE: 78 MMHG | HEIGHT: 69 IN | TEMPERATURE: 98 F | HEART RATE: 75 BPM | RESPIRATION RATE: 18 BRPM | SYSTOLIC BLOOD PRESSURE: 143 MMHG | WEIGHT: 145.51 LBS | OXYGEN SATURATION: 96 %

## 2023-11-17 DIAGNOSIS — Z98.890 OTHER SPECIFIED POSTPROCEDURAL STATES: Chronic | ICD-10-CM

## 2023-11-17 PROCEDURE — S2900 ROBOTIC SURGICAL SYSTEM: CPT

## 2023-11-17 PROCEDURE — 49650 LAP ING HERNIA REPAIR INIT: CPT

## 2023-11-17 DEVICE — MESH HERNIA INGUINAL PROGRIP LAPAROSCOPIC LEFT: Type: IMPLANTABLE DEVICE | Site: LEFT | Status: FUNCTIONAL

## 2023-11-17 RX ORDER — FINASTERIDE 5 MG/1
1 TABLET, FILM COATED ORAL
Qty: 0 | Refills: 0 | DISCHARGE

## 2023-11-17 RX ORDER — HYDROMORPHONE HYDROCHLORIDE 2 MG/ML
0.5 INJECTION INTRAMUSCULAR; INTRAVENOUS; SUBCUTANEOUS
Refills: 0 | Status: DISCONTINUED | OUTPATIENT
Start: 2023-11-17 | End: 2023-11-18

## 2023-11-17 RX ORDER — ACETAMINOPHEN 500 MG
1000 TABLET ORAL ONCE
Refills: 0 | Status: DISCONTINUED | OUTPATIENT
Start: 2023-11-17 | End: 2023-11-18

## 2023-11-17 RX ORDER — IBUPROFEN 200 MG
1 TABLET ORAL
Qty: 15 | Refills: 0
Start: 2023-11-17 | End: 2023-11-19

## 2023-11-17 RX ORDER — ACETAMINOPHEN 500 MG
2 TABLET ORAL
Qty: 40 | Refills: 0
Start: 2023-11-17

## 2023-11-17 RX ORDER — SODIUM CHLORIDE 9 MG/ML
1000 INJECTION, SOLUTION INTRAVENOUS
Refills: 0 | Status: DISCONTINUED | OUTPATIENT
Start: 2023-11-17 | End: 2023-11-18

## 2023-11-17 RX ORDER — ONDANSETRON 8 MG/1
4 TABLET, FILM COATED ORAL ONCE
Refills: 0 | Status: DISCONTINUED | OUTPATIENT
Start: 2023-11-17 | End: 2023-11-18

## 2023-11-17 RX ORDER — HYDROMORPHONE HYDROCHLORIDE 2 MG/ML
1 INJECTION INTRAMUSCULAR; INTRAVENOUS; SUBCUTANEOUS
Refills: 0 | Status: DISCONTINUED | OUTPATIENT
Start: 2023-11-17 | End: 2023-11-18

## 2023-11-17 RX ORDER — OXYCODONE HYDROCHLORIDE 5 MG/1
1 TABLET ORAL
Qty: 15 | Refills: 0
Start: 2023-11-17

## 2023-11-17 RX ORDER — ALPRAZOLAM 0.25 MG
1 TABLET ORAL
Qty: 0 | Refills: 0 | DISCHARGE

## 2023-11-17 RX ADMIN — HYDROMORPHONE HYDROCHLORIDE 0.5 MILLIGRAM(S): 2 INJECTION INTRAMUSCULAR; INTRAVENOUS; SUBCUTANEOUS at 17:41

## 2023-11-17 RX ADMIN — SODIUM CHLORIDE 125 MILLILITER(S): 9 INJECTION, SOLUTION INTRAVENOUS at 17:54

## 2023-11-17 NOTE — ASU DISCHARGE PLAN (ADULT/PEDIATRIC) - NS MD DC FALL RISK RISK
For information on Fall & Injury Prevention, visit: [email or web address] OR  [email or web address] OR  https://www.cdc.gov/steadi/patient.html

## 2023-11-17 NOTE — ASU PATIENT PROFILE, ADULT - HOW PATIENT ADDRESSED, PROFILE
[id number] [facility] Oncology at [facility]  [phone]    Hematology / Oncology Followup    Reason for Visit:   Brooke Kirk is a 27 y.o. female who is seen for follow up of lymphoma. Treatment History:   · US neck 5/26/2016: 2.5cm right posterior auricular mass  · US guided biopsy cervical node 6/30/2016: suspicious for large b-cell lymphoma  · CT Neck/C/A/P 7/25/2016: Enlarged right level 5 lymph node measuring 2 x 1 cm and slightly inferior to this there is a slightly enlarged lymph node measuring 1.2 x 0.8 cm. There is a slightly enlarged right level 2 lymph node measuring 1.3 x 0.9 cm. There is a slightly enlarged left level 2 lymph node measuring 1.7 x 1.1 cm. Lymph nodes more inferior at level 3 and level 4 are  normal in size. No supraclavicular adenopathy is identified. No adenopathy in chest, abdomen, or pelvis  · PET-CT 7/27/2016: No areas of hypermetabolic activity  · Excisional biopsy of right level 2 cervical node by Dr. 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 Trevon Macdonald completed 10/10/2016    History of Present Illness:   Here today for scheduled follow up. She reports feeling very well. No additional episodes of night sweats since last visit. Denies fevers, chills, weight loss, adenopathy. No complaints today. She is accompanied by her young daughter today. PAST HISTORY: The following sections were reviewed and updated in the EMR as appropriate: PMH, SH, FH, Medications, Allergies. Allergies   Allergen Reactions    Clonidine (Pf) Vertigo      Review of Systems: A complete review of systems was obtained, reviewed, and scanned into the EMR. Pertinent findings reviewed above.     Physical Exam:     Visit Vitals    /83 (BP 1 Location: Right arm, BP Patient Position: Sitting)    Pulse (!) 55    Resp 18    Ht 5' 8\" (1.727 m)    Wt 228 lb (103.4 kg)    SpO2 100%    Breastfeeding No    BMI 34.67 kg/m2     ECOG PS: 0  General: No distress  Eyes: PERRLA, anicteric sclerae  HENT: Atraumatic, OP clear  Neck: Supple  Lymphatic: No palpable cervical, supraclavicular, or inguinal adenopathy. Respiratory: CTAB, normal respiratory effort  CV: Normal rate, regular rhythm, no murmurs, no peripheral edema  GI: Soft, nontender, nondistended, no masses, no hepatomegaly, no splenomegaly  MS: Normal gait and station. Digits without clubbing or cyanosis. Skin: No rashes, ecchymoses, or petechiae. Normal temperature, turgor, and texture. Psych: Alert, oriented, appropriate affect, normal judgment/insight    Results:     Lab Results   Component Value Date/Time    WBC 5.1 08/28/2017 11:43 AM    HGB 12.7 08/28/2017 11:43 AM    HCT 37.1 08/28/2017 11:43 AM    PLATELET 074 62/06/4305 11:43 AM    MCV 86 08/28/2017 11:43 AM    ABS. NEUTROPHILS 3.4 08/28/2017 11:43 AM    Hgb, External 12.3 02/07/2014    Hct, External 35.6 02/07/2014    Platelet cnt., External 272 02/07/2014     Lab Results   Component Value Date/Time    Sodium 141 08/28/2017 11:43 AM    Potassium 3.8 08/28/2017 11:43 AM    Chloride 106 08/28/2017 11:43 AM    CO2 21 08/28/2017 11:43 AM    Glucose 94 08/28/2017 11:43 AM    BUN 11 08/28/2017 11:43 AM    Creatinine 0.74 08/28/2017 11:43 AM    GFR est  08/28/2017 11:43 AM    GFR est non- 08/28/2017 11:43 AM    Calcium 9.0 08/28/2017 11:43 AM     Lab Results   Component Value Date/Time    Bilirubin, total <0.2 08/28/2017 11:43 AM    ALT (SGPT) 12 08/28/2017 11:43 AM    AST (SGOT) 17 08/28/2017 11:43 AM    Alk.  phosphatase 77 08/28/2017 11:43 AM    Protein, total 6.7 08/28/2017 11:43 AM    Albumin 4.1 08/28/2017 11:43 AM    Globulin 3.4 05/17/2017 11:56 AM     Sed rate (ESR) (mm/hr)   Date Value   08/28/2017 3   02/13/2017 6     Sed rate, automated (mm/hr)   Date Value   05/17/2017 12   11/11/2016 34 (H)   08/10/2016 44 (H)         CT N/C/A/P 11/18/16: Resolved cervical lymph lymphadenopathy with no enlarged lymph nodes on the current exam.  No evidence for any recurrent or metastatic disease in the chest, abdomen, or pelvis    CT N/C/A/P 5/10/2017: No evidence for recurrent cervical adenopathy. Stable CT of the chest, abdomen, and pelvis. 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 Eva Brewster with resolution of cervical adenopathy on post-treatment CT scan. She is now in remission. She continues feeling well and is without evidence of disease recurrence at this time, based on history and exam. We will continue to follow with surveillance. Follow up after completion of therapy for Hodgkin Lymphoma, per NCCN:  · Year 1&2: H&P every 3-6 months  · Year 3: H&P every 6-12 months  · Year 4+: H&P every 12 months  · CBC, CMP, ESR as indicated  · TSH annually if RT to neck  · CT at 6, 12, and 24 months, or as clinically indicated  · Annual influenza vaccine    I have cautioned her to ensure she is not pregnant at the time of her surveillance CT scans, discussed again today. She is currently on birth control. 2) Leukopenia  Resolved on recent check. Possible from influenza. Monitor. 3) Night Sweat   No recurrence since last visit. One episode. Monitor and patient to notify us if this recurs.      Plan:     · Labs in 3 months: CBC, CMP, ESR ([facility])   · CT N/C/A/P in 3 months  · Return to clinic in 3 months      Signed By: Amador Almeida MD     September 6, 2017 [place]

## 2023-11-17 NOTE — ASU DISCHARGE PLAN (ADULT/PEDIATRIC) - CARE PROVIDER_API CALL
Carlos Enrique Haro  Surgery  [address]  [place] [id number]  Phone: [phone]  Fax: [phone]  Follow Up Time: 2 weeks

## 2023-11-17 NOTE — BRIEF OPERATIVE NOTE - SPECIMENS
Patient attended Phase 2 Cardiac Rehab Exercise Session. Further documentation will be scanned into the medical record upon discharge.   n/a

## 2023-11-17 NOTE — ASU DISCHARGE PLAN (ADULT/PEDIATRIC) - ASU DC SPECIAL INSTRUCTIONSFT
Follow up with Dr. Haro in 1-2 weeks. Please call to schedule an appointment.   NOTIFY YOUR SURGEON IF: You have any bleeding that does not stop, any pus draining from your wound, any fever (over 100.4 F) or chills, persistent nausea/vomiting, persistent diarrhea, or if your pain is not controlled on your discharge pain medications.    Wound: You may shower after 24 hours. After showering, pat steri strips dry. Leave the white steri strips in place, they will fall off on their own in approximately 5-7 days or they will be removed at your follow up appointment.

## 2023-11-21 ENCOUNTER — APPOINTMENT (OUTPATIENT)
Dept: INTERNAL MEDICINE | Facility: CLINIC | Age: 68
End: 2023-11-21

## 2023-11-24 DIAGNOSIS — F41.9 ANXIETY DISORDER, UNSPECIFIED: ICD-10-CM

## 2023-11-24 DIAGNOSIS — Z85.51 PERSONAL HISTORY OF MALIGNANT NEOPLASM OF BLADDER: ICD-10-CM

## 2023-11-24 DIAGNOSIS — K40.90 UNILATERAL INGUINAL HERNIA, WITHOUT OBSTRUCTION OR GANGRENE, NOT SPECIFIED AS RECURRENT: ICD-10-CM

## 2023-11-24 DIAGNOSIS — Z86.73 PERSONAL HISTORY OF TRANSIENT ISCHEMIC ATTACK (TIA), AND CEREBRAL INFARCTION WITHOUT RESIDUAL DEFICITS: ICD-10-CM

## 2023-11-24 DIAGNOSIS — F32.A DEPRESSION, UNSPECIFIED: ICD-10-CM

## 2023-11-24 DIAGNOSIS — N40.1 BENIGN PROSTATIC HYPERPLASIA WITH LOWER URINARY TRACT SYMPTOMS: ICD-10-CM

## 2023-11-29 ENCOUNTER — NON-APPOINTMENT (OUTPATIENT)
Age: 68
End: 2023-11-29

## 2023-12-10 PROBLEM — Z86.03 HISTORY OF NEOPLASM OF BLADDER: Status: RESOLVED | Noted: 2022-06-16 | Resolved: 2023-12-10

## 2023-12-10 PROBLEM — R82.89 ABNORMAL URINE CYTOLOGY: Status: RESOLVED | Noted: 2022-06-16 | Resolved: 2023-12-10

## 2023-12-10 PROBLEM — Z87.898 HISTORY OF DYSURIA: Status: RESOLVED | Noted: 2022-06-03 | Resolved: 2023-12-10

## 2023-12-10 PROBLEM — Z87.898 HISTORY OF GROSS HEMATURIA: Status: RESOLVED | Noted: 2022-06-03 | Resolved: 2023-12-10

## 2023-12-10 PROBLEM — N40.0 ENLARGED PROSTATE: Status: RESOLVED | Noted: 2022-06-22 | Resolved: 2023-12-10

## 2023-12-11 ENCOUNTER — APPOINTMENT (OUTPATIENT)
Dept: UROLOGY | Facility: CLINIC | Age: 68
End: 2023-12-11
Payer: COMMERCIAL

## 2023-12-11 ENCOUNTER — APPOINTMENT (OUTPATIENT)
Dept: SURGERY | Facility: CLINIC | Age: 68
End: 2023-12-11
Payer: COMMERCIAL

## 2023-12-11 ENCOUNTER — OUTPATIENT (OUTPATIENT)
Dept: OUTPATIENT SERVICES | Facility: HOSPITAL | Age: 68
LOS: 1 days | End: 2023-12-11
Payer: COMMERCIAL

## 2023-12-11 VITALS
WEIGHT: 144 LBS | DIASTOLIC BLOOD PRESSURE: 80 MMHG | TEMPERATURE: 98.2 F | BODY MASS INDEX: 21.33 KG/M2 | HEART RATE: 69 BPM | SYSTOLIC BLOOD PRESSURE: 129 MMHG | HEIGHT: 69 IN | OXYGEN SATURATION: 96 %

## 2023-12-11 VITALS
HEIGHT: 69 IN | SYSTOLIC BLOOD PRESSURE: 125 MMHG | DIASTOLIC BLOOD PRESSURE: 76 MMHG | WEIGHT: 144 LBS | BODY MASS INDEX: 21.33 KG/M2 | HEART RATE: 69 BPM | OXYGEN SATURATION: 99 %

## 2023-12-11 VITALS
OXYGEN SATURATION: 98 % | BODY MASS INDEX: 21.33 KG/M2 | HEIGHT: 69 IN | DIASTOLIC BLOOD PRESSURE: 78 MMHG | HEART RATE: 68 BPM | WEIGHT: 144 LBS | SYSTOLIC BLOOD PRESSURE: 129 MMHG

## 2023-12-11 DIAGNOSIS — R82.89 OTHER ABNRM FNDNGS ON CYTOLOGICAL: ICD-10-CM

## 2023-12-11 DIAGNOSIS — Z98.890 OTHER SPECIFIED POSTPROCEDURAL STATES: Chronic | ICD-10-CM

## 2023-12-11 DIAGNOSIS — Z87.898 PERSONAL HISTORY OF OTHER SPECIFIED CONDITIONS: ICD-10-CM

## 2023-12-11 DIAGNOSIS — N40.0 BENIGN PROSTATIC HYPERPLASIA WITHOUT LOWER URINARY TRACT SYMPMS: ICD-10-CM

## 2023-12-11 DIAGNOSIS — Z86.03 PERSONAL HISTORY OF NEOPLASM OF UNCERTAIN BEHAVIOR: ICD-10-CM

## 2023-12-11 PROCEDURE — 99024 POSTOP FOLLOW-UP VISIT: CPT

## 2023-12-11 PROCEDURE — 52000 CYSTOURETHROSCOPY: CPT

## 2023-12-11 PROCEDURE — 99213 OFFICE O/P EST LOW 20 MIN: CPT | Mod: 25

## 2023-12-11 RX ORDER — EMOLLIENT COMBINATION NO.32
EMULSION, EXTENDED RELEASE TOPICAL TWICE DAILY
Qty: 1 | Refills: 3 | Status: DISCONTINUED | COMMUNITY
Start: 2018-08-02 | End: 2023-12-11

## 2023-12-11 RX ORDER — BACILLUS CALMETTE-GUERIN 50 MG/50ML
50 POWDER, FOR SUSPENSION INTRAVESICAL
Qty: 1 | Refills: 0 | Status: DISCONTINUED | OUTPATIENT
Start: 2023-01-12 | End: 2023-12-11

## 2023-12-11 RX ORDER — IBUPROFEN 600 MG/1
600 TABLET, FILM COATED ORAL
Qty: 15 | Refills: 0 | Status: ACTIVE | COMMUNITY
Start: 2023-11-17

## 2023-12-11 RX ORDER — ALPRAZOLAM 0.5 MG/1
0.5 TABLET ORAL
Qty: 90 | Refills: 0 | Status: ACTIVE | COMMUNITY
Start: 2023-07-27

## 2023-12-11 RX ORDER — OXYCODONE 5 MG/1
5 TABLET ORAL
Qty: 15 | Refills: 0 | Status: COMPLETED | COMMUNITY
Start: 2023-11-17

## 2023-12-11 RX ORDER — GEMCITABINE HYDROCHLORIDE 2 G/52.6ML
2 INJECTION INTRAVENOUS
Qty: 1 | Refills: 0 | Status: DISCONTINUED | OUTPATIENT
Start: 2022-08-03 | End: 2023-12-11

## 2023-12-11 RX ORDER — BACILLUS CALMETTE-GUERIN 50 MG/50ML
50 POWDER, FOR SUSPENSION INTRAVESICAL
Qty: 1 | Refills: 0 | Status: DISCONTINUED | OUTPATIENT
Start: 2023-01-26 | End: 2023-12-11

## 2023-12-11 RX ORDER — FINASTERIDE 5 MG/1
5 TABLET, FILM COATED ORAL DAILY
Qty: 90 | Refills: 3 | Status: DISCONTINUED | COMMUNITY
Start: 2023-09-18 | End: 2023-12-11

## 2023-12-11 RX ORDER — DIAZEPAM 10 MG/1
10 TABLET ORAL
Qty: 3 | Refills: 0 | Status: DISCONTINUED | COMMUNITY
Start: 2022-06-06 | End: 2023-12-11

## 2023-12-19 NOTE — HISTORY OF PRESENT ILLNESS
[de-identified] : 68 year old man s/p laparoscopic, robotic assisted left inguinal hernia repair with mesh on 11/17/2023.  [de-identified] : Patient presents to the office for a follow up visit. Denies any nausea, vomiting, fever or chills. Tolerating PO intake with normal GI and  function. Ambulating without issues.   On exam: awake, alert No scleral icterus Breathing comfortably on room air Abd is soft, not distended, and not tender Incisions are clean, dry and intact without erythema

## 2023-12-22 NOTE — ASSESSMENT
[FreeTextEntry1] : - Urine cytology today. Will call with results. -F/U in 3 months for cysto. - 3 months repeat CT abdomen and pelvis  urogram.

## 2023-12-22 NOTE — HISTORY OF PRESENT ILLNESS
[FreeTextEntry1] : Former patient of Dr. Lemos. Presented with hematuria.  Cystoscopy demonstrated papillary lesion. TURBT 8/3/2022: HG TCC, pT1 Repeat TURBT/bladder biopsy 12/14/2022: HG TCC, pTa.  CT a/p Urogram 8/25/2022: No hydronephrosis, no adenopathy.  s/p Induction BCG x 6 1/12/2023 thru 2/16/2023.  Here for surveillance cystoscopy.  PSA 11/6/2023: 2.23 ng/mL.  11/17/23 Hernia Repair.   Urinary symptoms: stable. Nocturia: 1-2 times on finasteride.

## 2023-12-27 DIAGNOSIS — C67.9 MALIGNANT NEOPLASM OF BLADDER, UNSPECIFIED: ICD-10-CM

## 2024-01-02 ENCOUNTER — NON-APPOINTMENT (OUTPATIENT)
Age: 69
End: 2024-01-02

## 2024-01-30 ENCOUNTER — NON-APPOINTMENT (OUTPATIENT)
Age: 69
End: 2024-01-30

## 2024-03-01 ENCOUNTER — NON-APPOINTMENT (OUTPATIENT)
Age: 69
End: 2024-03-01

## 2024-03-02 ENCOUNTER — NON-APPOINTMENT (OUTPATIENT)
Age: 69
End: 2024-03-02

## 2024-03-04 ENCOUNTER — APPOINTMENT (OUTPATIENT)
Dept: SURGERY | Facility: CLINIC | Age: 69
End: 2024-03-04
Payer: COMMERCIAL

## 2024-03-04 VITALS
WEIGHT: 145 LBS | DIASTOLIC BLOOD PRESSURE: 86 MMHG | HEIGHT: 69 IN | TEMPERATURE: 97.6 F | BODY MASS INDEX: 21.48 KG/M2 | OXYGEN SATURATION: 97 % | HEART RATE: 75 BPM | SYSTOLIC BLOOD PRESSURE: 139 MMHG

## 2024-03-04 PROCEDURE — 99213 OFFICE O/P EST LOW 20 MIN: CPT

## 2024-03-04 NOTE — PHYSICAL EXAM
[No Rash or Lesion] : No rash or lesion [Alert] : alert [Oriented to Place] : oriented to place [Oriented to Person] : oriented to person [Calm] : calm [de-identified] : no scleral icterus [de-identified] : comfortable, well appearing [de-identified] : soft, not tender and not distended Incisions are clean, dry and intact. No bulge or protrusion visually. No palpable hernia defect despite valsalva maneuvers.  [de-identified] : breathing comfortably on room air, no cough

## 2024-03-04 NOTE — HISTORY OF PRESENT ILLNESS
[de-identified] : 68 year old man s/p laparoscopic, robotic assisted left inguinal hernia repair with mesh on 11/17/2023.  [de-identified] : He presents to the office as he has been having some left groin pain over the past few weeks. He has returned to his active lifestyle. Recently skiing, running, and helping his daughter move. He had no pain until recently; when he noticed pain in the left groin. He has not noticed a recurrent bulge. He states when he goes for a run, he notices the pain in the beginning of his run, and then it gets better. He also states that last week he had significant pain while walking to [employer].   Currently, pain improved. No nausea, vomiting ,fever or chills. Tolerating PO intake. Having GI function. Although patient is concerned for hernia recurrence, he is very anxious if this is recurrent cancer. He has a CT scheduled for Thursday, and a cysto scheduled for the following week.

## 2024-03-04 NOTE — PLAN
[FreeTextEntry1] : 68 year old with left groin pain, history of hernia repair. No palpable recurrent hernia on exam.  - patient scheduled for CT abd/pelvis on Thursday; will follow up imaging and discuss with patient

## 2024-03-07 ENCOUNTER — OUTPATIENT (OUTPATIENT)
Dept: OUTPATIENT SERVICES | Facility: HOSPITAL | Age: 69
LOS: 1 days | End: 2024-03-07
Payer: COMMERCIAL

## 2024-03-07 ENCOUNTER — APPOINTMENT (OUTPATIENT)
Dept: CT IMAGING | Facility: IMAGING CENTER | Age: 69
End: 2024-03-07
Payer: COMMERCIAL

## 2024-03-07 ENCOUNTER — RESULT REVIEW (OUTPATIENT)
Age: 69
End: 2024-03-07

## 2024-03-07 DIAGNOSIS — Z98.890 OTHER SPECIFIED POSTPROCEDURAL STATES: Chronic | ICD-10-CM

## 2024-03-07 DIAGNOSIS — C67.9 MALIGNANT NEOPLASM OF BLADDER, UNSPECIFIED: ICD-10-CM

## 2024-03-07 PROCEDURE — 74178 CT ABD&PLV WO CNTR FLWD CNTR: CPT | Mod: 26

## 2024-03-07 PROCEDURE — 74178 CT ABD&PLV WO CNTR FLWD CNTR: CPT

## 2024-03-14 ENCOUNTER — APPOINTMENT (OUTPATIENT)
Dept: UROLOGY | Facility: CLINIC | Age: 69
End: 2024-03-14
Payer: COMMERCIAL

## 2024-03-14 ENCOUNTER — OUTPATIENT (OUTPATIENT)
Dept: OUTPATIENT SERVICES | Facility: HOSPITAL | Age: 69
LOS: 1 days | End: 2024-03-14
Payer: COMMERCIAL

## 2024-03-14 DIAGNOSIS — Z98.890 OTHER SPECIFIED POSTPROCEDURAL STATES: Chronic | ICD-10-CM

## 2024-03-14 DIAGNOSIS — R35.0 FREQUENCY OF MICTURITION: ICD-10-CM

## 2024-03-14 PROCEDURE — 52000 CYSTOURETHROSCOPY: CPT

## 2024-03-18 LAB — URINE CYTOLOGY: NORMAL

## 2024-03-26 DIAGNOSIS — C67.9 MALIGNANT NEOPLASM OF BLADDER, UNSPECIFIED: ICD-10-CM

## 2024-04-04 ENCOUNTER — NON-APPOINTMENT (OUTPATIENT)
Age: 69
End: 2024-04-04

## 2024-04-18 ENCOUNTER — NON-APPOINTMENT (OUTPATIENT)
Age: 69
End: 2024-04-18

## 2024-05-17 ENCOUNTER — NON-APPOINTMENT (OUTPATIENT)
Age: 69
End: 2024-05-17

## 2024-06-20 ENCOUNTER — APPOINTMENT (OUTPATIENT)
Dept: UROLOGY | Facility: CLINIC | Age: 69
End: 2024-06-20
Payer: COMMERCIAL

## 2024-06-20 ENCOUNTER — OUTPATIENT (OUTPATIENT)
Dept: OUTPATIENT SERVICES | Facility: HOSPITAL | Age: 69
LOS: 1 days | End: 2024-06-20
Payer: COMMERCIAL

## 2024-06-20 VITALS
SYSTOLIC BLOOD PRESSURE: 131 MMHG | TEMPERATURE: 98.2 F | HEART RATE: 78 BPM | DIASTOLIC BLOOD PRESSURE: 78 MMHG | RESPIRATION RATE: 16 BRPM

## 2024-06-20 DIAGNOSIS — R35.0 FREQUENCY OF MICTURITION: ICD-10-CM

## 2024-06-20 DIAGNOSIS — C67.9 MALIGNANT NEOPLASM OF BLADDER, UNSPECIFIED: ICD-10-CM

## 2024-06-20 DIAGNOSIS — Z98.890 OTHER SPECIFIED POSTPROCEDURAL STATES: Chronic | ICD-10-CM

## 2024-06-20 PROCEDURE — 52000 CYSTOURETHROSCOPY: CPT

## 2024-06-22 LAB — URINE CYTOLOGY: NORMAL

## 2024-06-24 ENCOUNTER — NON-APPOINTMENT (OUTPATIENT)
Age: 69
End: 2024-06-24

## 2024-06-24 RX ORDER — ALPRAZOLAM 0.25 MG/1
0.25 TABLET ORAL EVERY 4 HOURS
Qty: 180 | Refills: 0 | Status: ACTIVE | COMMUNITY
Start: 2017-05-01 | End: 1900-01-01

## 2024-06-25 DIAGNOSIS — C67.9 MALIGNANT NEOPLASM OF BLADDER, UNSPECIFIED: ICD-10-CM

## 2024-07-25 ENCOUNTER — NON-APPOINTMENT (OUTPATIENT)
Age: 69
End: 2024-07-25

## 2024-08-28 ENCOUNTER — NON-APPOINTMENT (OUTPATIENT)
Age: 69
End: 2024-08-28

## 2024-09-26 ENCOUNTER — APPOINTMENT (OUTPATIENT)
Dept: UROLOGY | Facility: CLINIC | Age: 69
End: 2024-09-26
Payer: COMMERCIAL

## 2024-09-26 ENCOUNTER — OUTPATIENT (OUTPATIENT)
Dept: OUTPATIENT SERVICES | Facility: HOSPITAL | Age: 69
LOS: 1 days | End: 2024-09-26
Payer: COMMERCIAL

## 2024-09-26 VITALS — HEART RATE: 69 BPM | SYSTOLIC BLOOD PRESSURE: 129 MMHG | DIASTOLIC BLOOD PRESSURE: 72 MMHG

## 2024-09-26 DIAGNOSIS — Z98.890 OTHER SPECIFIED POSTPROCEDURAL STATES: Chronic | ICD-10-CM

## 2024-09-26 DIAGNOSIS — C67.9 MALIGNANT NEOPLASM OF BLADDER, UNSPECIFIED: ICD-10-CM

## 2024-09-26 DIAGNOSIS — R35.0 FREQUENCY OF MICTURITION: ICD-10-CM

## 2024-09-26 PROCEDURE — 52000 CYSTOURETHROSCOPY: CPT

## 2024-09-27 DIAGNOSIS — C67.9 MALIGNANT NEOPLASM OF BLADDER, UNSPECIFIED: ICD-10-CM

## 2024-09-28 LAB — URINE CYTOLOGY: NORMAL

## 2024-10-28 ENCOUNTER — NON-APPOINTMENT (OUTPATIENT)
Age: 69
End: 2024-10-28

## 2024-11-21 ENCOUNTER — APPOINTMENT (OUTPATIENT)
Dept: INTERNAL MEDICINE | Facility: CLINIC | Age: 69
End: 2024-11-21

## 2024-11-21 ENCOUNTER — NON-APPOINTMENT (OUTPATIENT)
Age: 69
End: 2024-11-21

## 2024-11-21 VITALS
WEIGHT: 146 LBS | DIASTOLIC BLOOD PRESSURE: 80 MMHG | SYSTOLIC BLOOD PRESSURE: 128 MMHG | HEIGHT: 69 IN | OXYGEN SATURATION: 98 % | BODY MASS INDEX: 21.62 KG/M2 | HEART RATE: 78 BPM

## 2024-11-21 DIAGNOSIS — C44.91 BASAL CELL CARCINOMA OF SKIN, UNSPECIFIED: ICD-10-CM

## 2024-11-21 DIAGNOSIS — Z00.00 ENCOUNTER FOR GENERAL ADULT MEDICAL EXAMINATION W/OUT ABNORMAL FINDINGS: ICD-10-CM

## 2024-11-21 PROCEDURE — G0008: CPT

## 2024-11-21 PROCEDURE — 93000 ELECTROCARDIOGRAM COMPLETE: CPT

## 2024-11-21 PROCEDURE — 90662 IIV NO PRSV INCREASED AG IM: CPT

## 2024-11-21 PROCEDURE — 36415 COLL VENOUS BLD VENIPUNCTURE: CPT

## 2024-11-21 PROCEDURE — 99397 PER PM REEVAL EST PAT 65+ YR: CPT | Mod: 25

## 2024-11-27 ENCOUNTER — NON-APPOINTMENT (OUTPATIENT)
Age: 69
End: 2024-11-27

## 2024-11-27 LAB
25(OH)D3 SERPL-MCNC: 39.8 NG/ML
ALBUMIN SERPL ELPH-MCNC: 4.2 G/DL
ALP BLD-CCNC: 47 U/L
ALT SERPL-CCNC: 20 U/L
ANION GAP SERPL CALC-SCNC: 11 MMOL/L
APPEARANCE: CLEAR
AST SERPL-CCNC: 22 U/L
BACTERIA: NEGATIVE /HPF
BASOPHILS # BLD AUTO: 0.02 K/UL
BASOPHILS NFR BLD AUTO: 0.4 %
BILIRUB SERPL-MCNC: 0.8 MG/DL
BILIRUBIN URINE: NEGATIVE
BLOOD URINE: NEGATIVE
BUN SERPL-MCNC: 11 MG/DL
CALCIUM SERPL-MCNC: 8.1 MG/DL
CAST: 1 /LPF
CHLORIDE SERPL-SCNC: 103 MMOL/L
CHOLEST SERPL-MCNC: 227 MG/DL
CO2 SERPL-SCNC: 25 MMOL/L
COLOR: YELLOW
CREAT SERPL-MCNC: 1.04 MG/DL
EGFR: 78 ML/MIN/1.73M2
EOSINOPHIL # BLD AUTO: 0.47 K/UL
EOSINOPHIL NFR BLD AUTO: 8.4 %
EPITHELIAL CELLS: 0 /HPF
ESTIMATED AVERAGE GLUCOSE: 111 MG/DL
GLUCOSE QUALITATIVE U: NEGATIVE MG/DL
GLUCOSE SERPL-MCNC: 115 MG/DL
HBA1C MFR BLD HPLC: 5.5 %
HCT VFR BLD CALC: 42.9 %
HDLC SERPL-MCNC: 96 MG/DL
HGB BLD-MCNC: 14.6 G/DL
IMM GRANULOCYTES NFR BLD AUTO: 0.2 %
KETONES URINE: NEGATIVE MG/DL
LDLC SERPL CALC-MCNC: 118 MG/DL
LEUKOCYTE ESTERASE URINE: NEGATIVE
LYMPHOCYTES # BLD AUTO: 2.12 K/UL
LYMPHOCYTES NFR BLD AUTO: 37.8 %
MAN DIFF?: NORMAL
MCHC RBC-ENTMCNC: 32.7 PG
MCHC RBC-ENTMCNC: 34 G/DL
MCV RBC AUTO: 96 FL
MICROSCOPIC-UA: NORMAL
MONOCYTES # BLD AUTO: 0.37 K/UL
MONOCYTES NFR BLD AUTO: 6.6 %
NEUTROPHILS # BLD AUTO: 2.62 K/UL
NEUTROPHILS NFR BLD AUTO: 46.6 %
NITRITE URINE: NEGATIVE
NONHDLC SERPL-MCNC: 130 MG/DL
PH URINE: 5.5
PLATELET # BLD AUTO: 255 K/UL
POTASSIUM SERPL-SCNC: 4.6 MMOL/L
PROT SERPL-MCNC: 6.9 G/DL
PROTEIN URINE: NORMAL MG/DL
PSA SERPL-MCNC: 1.66 NG/ML
RBC # BLD: 4.47 M/UL
RBC # FLD: 12.8 %
RED BLOOD CELLS URINE: 0 /HPF
SODIUM SERPL-SCNC: 139 MMOL/L
SPECIFIC GRAVITY URINE: 1.02
T4 SERPL-MCNC: 4.6 UG/DL
TRIGL SERPL-MCNC: 71 MG/DL
TSH SERPL-ACNC: 2.22 UIU/ML
URATE SERPL-MCNC: 4.6 MG/DL
UROBILINOGEN URINE: 0.2 MG/DL
WBC # FLD AUTO: 5.61 K/UL
WHITE BLOOD CELLS URINE: 0 /HPF

## 2024-12-30 ENCOUNTER — NON-APPOINTMENT (OUTPATIENT)
Age: 69
End: 2024-12-30

## 2025-01-27 ENCOUNTER — APPOINTMENT (OUTPATIENT)
Dept: UROLOGY | Facility: CLINIC | Age: 70
End: 2025-01-27

## 2025-01-27 ENCOUNTER — OUTPATIENT (OUTPATIENT)
Dept: OUTPATIENT SERVICES | Facility: HOSPITAL | Age: 70
LOS: 1 days | End: 2025-01-27
Payer: COMMERCIAL

## 2025-01-27 VITALS — TEMPERATURE: 98.2 F | SYSTOLIC BLOOD PRESSURE: 139 MMHG | HEART RATE: 91 BPM | DIASTOLIC BLOOD PRESSURE: 74 MMHG

## 2025-01-27 DIAGNOSIS — R35.0 FREQUENCY OF MICTURITION: ICD-10-CM

## 2025-01-27 DIAGNOSIS — Z98.890 OTHER SPECIFIED POSTPROCEDURAL STATES: Chronic | ICD-10-CM

## 2025-01-27 DIAGNOSIS — C67.9 MALIGNANT NEOPLASM OF BLADDER, UNSPECIFIED: ICD-10-CM

## 2025-01-27 PROCEDURE — 52000 CYSTOURETHROSCOPY: CPT

## 2025-01-31 LAB — URINE CYTOLOGY: NORMAL

## 2025-02-04 ENCOUNTER — NON-APPOINTMENT (OUTPATIENT)
Age: 70
End: 2025-02-04

## 2025-02-10 DIAGNOSIS — C67.9 MALIGNANT NEOPLASM OF BLADDER, UNSPECIFIED: ICD-10-CM

## 2025-03-06 ENCOUNTER — NON-APPOINTMENT (OUTPATIENT)
Age: 70
End: 2025-03-06

## 2025-03-24 ENCOUNTER — NON-APPOINTMENT (OUTPATIENT)
Age: 70
End: 2025-03-24

## 2025-04-07 DIAGNOSIS — R07.81 PLEURODYNIA: ICD-10-CM

## 2025-04-07 DIAGNOSIS — M54.50 LOW BACK PAIN, UNSPECIFIED: ICD-10-CM

## 2025-05-02 ENCOUNTER — NON-APPOINTMENT (OUTPATIENT)
Age: 70
End: 2025-05-02

## 2025-06-05 ENCOUNTER — NON-APPOINTMENT (OUTPATIENT)
Age: 70
End: 2025-06-05

## 2025-07-03 ENCOUNTER — APPOINTMENT (OUTPATIENT)
Dept: UROLOGY | Facility: CLINIC | Age: 70
End: 2025-07-03

## 2025-07-03 ENCOUNTER — OUTPATIENT (OUTPATIENT)
Dept: OUTPATIENT SERVICES | Facility: HOSPITAL | Age: 70
LOS: 1 days | End: 2025-07-03
Payer: COMMERCIAL

## 2025-07-03 VITALS
HEART RATE: 73 BPM | WEIGHT: 146 LBS | SYSTOLIC BLOOD PRESSURE: 129 MMHG | OXYGEN SATURATION: 98 % | RESPIRATION RATE: 16 BRPM | DIASTOLIC BLOOD PRESSURE: 76 MMHG | HEIGHT: 69 IN | BODY MASS INDEX: 21.62 KG/M2

## 2025-07-03 DIAGNOSIS — Z98.890 OTHER SPECIFIED POSTPROCEDURAL STATES: Chronic | ICD-10-CM

## 2025-07-03 DIAGNOSIS — R35.0 FREQUENCY OF MICTURITION: ICD-10-CM

## 2025-07-03 DIAGNOSIS — C67.9 MALIGNANT NEOPLASM OF BLADDER, UNSPECIFIED: ICD-10-CM

## 2025-07-03 PROCEDURE — 52000 CYSTOURETHROSCOPY: CPT

## 2025-07-07 LAB — URINE CYTOLOGY: NORMAL

## 2025-07-10 ENCOUNTER — NON-APPOINTMENT (OUTPATIENT)
Age: 70
End: 2025-07-10
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
## 2025-07-18 RX ORDER — DOXYCYCLINE HYCLATE 100 MG/1
100 CAPSULE ORAL TWICE DAILY
Qty: 20 | Refills: 0 | Status: ACTIVE | COMMUNITY
Start: 2025-07-18 | End: 1900-01-01

## 2025-07-18 RX ORDER — DOXYCYCLINE HYCLATE 100 MG/1
100 CAPSULE ORAL
Qty: 2 | Refills: 0 | Status: ACTIVE | COMMUNITY
Start: 2025-07-18 | End: 1900-01-01

## 2025-07-19 LAB
B BURGDOR AB SER-IMP: NEGATIVE
B BURGDOR IGG+IGM SER QL: 0.15 INDEX

## 2025-07-31 ENCOUNTER — APPOINTMENT (OUTPATIENT)
Dept: UROLOGY | Facility: CLINIC | Age: 70
End: 2025-07-31

## 2025-08-12 ENCOUNTER — NON-APPOINTMENT (OUTPATIENT)
Age: 70
End: 2025-08-12

## 2025-09-18 ENCOUNTER — NON-APPOINTMENT (OUTPATIENT)
Age: 70
End: 2025-09-18

## 2025-09-25 LAB
B BURGDOR AB SER-IMP: NEGATIVE
B BURGDOR IGG+IGM SER QL: 0.14 INDEX

## (undated) DEVICE — SOL IRR POUR H2O 500ML

## (undated) DEVICE — SUT VLOC 180 2-0 6" GS-22 GREEN

## (undated) DEVICE — XI OBTURATOR OPTICAL BLADELESS 8MM

## (undated) DEVICE — BASIN SET DOUBLE

## (undated) DEVICE — VENODYNE/SCD SLEEVE CALF MEDIUM

## (undated) DEVICE — TUBING TUR 2 PRONG

## (undated) DEVICE — XI DRAPE COLUMN

## (undated) DEVICE — SOL IRR POUR H2O 250ML

## (undated) DEVICE — TUBING INSUFFLATION LAP FILTER 10FT

## (undated) DEVICE — GOWN XL W TOWEL

## (undated) DEVICE — SOL IRR BAG H2O 3000ML

## (undated) DEVICE — Device

## (undated) DEVICE — DRAPE SPLIT SHEET 77" X 120"

## (undated) DEVICE — GLV 7.5 PROTEXIS (WHITE)

## (undated) DEVICE — LABELS BLANK W PEN

## (undated) DEVICE — GLV 7 PROTEXIS (WHITE)

## (undated) DEVICE — ELCTR GROUNDING PAD ADULT COVIDIEN

## (undated) DEVICE — CABLE DAC ACTIVE CORD

## (undated) DEVICE — POSITIONER STRAP ARMBOARD VELCRO TS-30

## (undated) DEVICE — BLADE SCALPEL SAFETYLOCK #15

## (undated) DEVICE — XI TIP COVER

## (undated) DEVICE — PREP CHLORAPREP HI-LITE ORANGE 26ML

## (undated) DEVICE — SPECIMEN CONTAINER 100ML

## (undated) DEVICE — BLADE SCALPEL SAFETYLOCK #10

## (undated) DEVICE — PREP BETADINE SPONGE STICKS

## (undated) DEVICE — SOL IRR POUR NS 0.9% 500ML

## (undated) DEVICE — DRAPE 3/4 SHEET W REINFORCEMENT 56X77"

## (undated) DEVICE — ELCTR CUTTING LOOP 24FR RIGHT ANGLE

## (undated) DEVICE — SUT VICRYL 0 27" UR-6

## (undated) DEVICE — NDL HYPO SAFE 25G X 1.5" (ORANGE)

## (undated) DEVICE — TUBING STRYKEFLOW II SUCTION / IRRIGATOR

## (undated) DEVICE — XI SEAL UNIV 5- 8 MM

## (undated) DEVICE — VENODYNE/SCD SLEEVE FOOT

## (undated) DEVICE — TUBING SUCTION 20FT

## (undated) DEVICE — POSITIONER FOAM EGG CRATE ULNAR 2PCS (PINK)

## (undated) DEVICE — XI ARM NEEDLE DRIVER LARGE

## (undated) DEVICE — DRSG OPSITE 13.75 X 4"

## (undated) DEVICE — WARMING BLANKET UPPER ADULT

## (undated) DEVICE — SUT MONOCRYL 4-0 27" PS-2 UNDYED

## (undated) DEVICE — DRSG STERISTRIPS 0.5 X 4"

## (undated) DEVICE — POSITIONER FOAM HEAD CRADLE (PINK)

## (undated) DEVICE — D HELP - CLEARVIEW CLEARIFY SYSTEM

## (undated) DEVICE — SYR LUER LOK 10CC

## (undated) DEVICE — DRAPE BACK TABLE COVER 44X90"

## (undated) DEVICE — XI ARM FORCEP FENESTRATED BIPOLAR 8MM

## (undated) DEVICE — XI ARM SCISSOR MONO CURVED

## (undated) DEVICE — ELCTR PLASMA ROLLER 24FR 12-30 DEG

## (undated) DEVICE — TUBING SUCTION NONCONDUCTIVE 6MM X 12FT

## (undated) DEVICE — DRSG TEGADERM 6"X8"

## (undated) DEVICE — XI DRAPE ARM